# Patient Record
Sex: FEMALE | Race: WHITE | Employment: FULL TIME
[De-identification: names, ages, dates, MRNs, and addresses within clinical notes are randomized per-mention and may not be internally consistent; named-entity substitution may affect disease eponyms.]

---

## 2017-05-26 ENCOUNTER — HEALTH MAINTENANCE LETTER (OUTPATIENT)
Age: 37
End: 2017-05-26

## 2017-07-17 ENCOUNTER — OFFICE VISIT (OUTPATIENT)
Dept: FAMILY MEDICINE | Facility: CLINIC | Age: 37
End: 2017-07-17
Payer: COMMERCIAL

## 2017-07-17 VITALS
SYSTOLIC BLOOD PRESSURE: 102 MMHG | BODY MASS INDEX: 22.49 KG/M2 | DIASTOLIC BLOOD PRESSURE: 68 MMHG | OXYGEN SATURATION: 100 % | WEIGHT: 135 LBS | TEMPERATURE: 97.1 F | RESPIRATION RATE: 14 BRPM | HEART RATE: 74 BPM | HEIGHT: 65 IN

## 2017-07-17 DIAGNOSIS — I78.1 NON-NEOPLASTIC NEVUS: Primary | ICD-10-CM

## 2017-07-17 DIAGNOSIS — L91.8 SKIN TAG: ICD-10-CM

## 2017-07-17 PROCEDURE — 99207 ZZC DROP WITH A PROCEDURE: CPT | Mod: 25 | Performed by: FAMILY MEDICINE

## 2017-07-17 PROCEDURE — 11200 RMVL SKIN TAGS UP TO&INC 15: CPT | Performed by: FAMILY MEDICINE

## 2017-07-17 PROCEDURE — 11310 SHAVE SKIN LESION 0.5 CM/<: CPT | Mod: 59 | Performed by: FAMILY MEDICINE

## 2017-07-17 ASSESSMENT — PAIN SCALES - GENERAL: PAINLEVEL: NO PAIN (0)

## 2017-07-17 NOTE — MR AVS SNAPSHOT
After Visit Summary   7/17/2017    Lisa Combs    MRN: [id number]           Patient Information     Date Of Birth          1980        Visit Information        Provider Department      7/17/2017 11:20 AM Maral Ybarra MD [facility]         Follow-ups after your visit        Your next 10 appointments already scheduled     Aug 01, 2017  2:20 PM CDT   SHORT with Maral Augustine Mai, MD   [facility] ([facility])    [address] [place] [address] [id number] [phone]              Who to contact     If you have questions or need follow up information about today's clinic visit or your schedule please contact [facility] directly at [phone].  Normal or non-critical lab and imaging results will be communicated to you by MyChart, letter or phone within 4 business days after the clinic has received the results. If you do not hear from us within 7 days, please contact the clinic through MyChart or phone. If you have a critical or abnormal lab result, we will notify you by phone as soon as possible.  Submit refill requests through GAMINSIDE or call your pharmacy and they will forward the refill request to us. Please allow 3 business days for your refill to be completed.          Additional Information About Your Visit        MyChart Information     GAMINSIDE gives you secure access to your electronic health record. If you see a primary care provider, you can also send messages to your care team and make appointments. If you have questions, please call your primary care clinic.  If you do not have a primary care provider, please call [phone] and they will assist you.        Care EveryWhere ID     This is your Care EveryWhere ID. This could be used by other organizations to access your [place] medical records  [id number]        Your Vitals Were     Pulse Temperature Respirations Height Last Period Pulse Oximetry    74 97.1  F (36.2  " C) (Temporal) 14 5' 5\" (1.651 m) 07/15/2017 100%    BMI (Body Mass Index)                   22.47 kg/m2            Blood Pressure from Last 3 Encounters:   07/17/17 102/68   10/23/11 110/64   08/19/11 100/64    Weight from Last 3 Encounters:   07/17/17 135 lb (61.2 kg)   08/19/11 143 lb 12.8 oz (65.2 kg)   09/03/09 133 lb 9.6 oz (60.6 kg)              Today, you had the following     No orders found for display       Primary Care Provider    None Specified       No primary provider on file.        Equal Access to Services     [facility]: Hadcarly Shahid, sherry ruiz, emma gaston, mark medina . So [facility] [phone].    ATENCIÓN: Si habla español, tiene a hatch disposición servicios gratuitos de asistencia lingüística. Llame al [phone].    We comply with applicable federal civil rights laws and [place] laws. We do not discriminate on the basis of race, color, national origin, age, disability sex, sexual orientation or gender identity.            Thank you!     Thank you for choosing [facility]  for your care. Our goal is always to provide you with excellent care. Hearing back from our patients is one way we can continue to improve our services. Please take a few minutes to complete the written survey that you may receive in the mail after your visit with us. Thank you!             Your Updated Medication List - Protect others around you: Learn how to safely use, store and throw away your medicines at www.disposemymeds.org.          This list is accurate as of: 7/17/17 11:30 AM.  Always use your most recent med list.                   Brand Name Dispense Instructions for use Diagnosis    DEPO-PROVERA 150 MG/ML injection   Generic drug:  medroxyPROGESTERone      1 ML EVERY 3 MONTHS          "

## 2017-07-17 NOTE — NURSING NOTE
"Chief Complaint   Patient presents with     Consult     mole removal       Initial /68 (BP Location: Left arm, Patient Position: Sitting, Cuff Size: Adult Regular)  Pulse 74  Temp 97.1  F (36.2  C) (Temporal)  Resp 14  Ht 5' 5\" (1.651 m)  Wt 135 lb (61.2 kg)  LMP 07/15/2017  SpO2 100%  BMI 22.47 kg/m2 Estimated body mass index is 22.47 kg/(m^2) as calculated from the following:    Height as of this encounter: 5' 5\" (1.651 m).    Weight as of this encounter: 135 lb (61.2 kg).  Medication Reconciliation: complete     Health Maintenance Due   Topic Date Due     TETANUS IMMUNIZATION (SYSTEM ASSIGNED)  02/04/2000     PAP Q1 YR DIAGNOSTIC  08/19/2012     Health Maintenance reviewed at today's visit patient asked to schedule/complete:   Cervical Cancer:  Patient agrees to schedule  Immunizations:  Patient agrees to schedule    Tierney Clark MA 7/17/2017        "

## 2017-07-17 NOTE — PROGRESS NOTES
"  SUBJECTIVE:                                                    Lisa Combs is a 37 year old female who presents to clinic today for the following health issues:    Consult Mole Removal    She has several moles and skin tags that need to be removed.  One on the right face just below the eye.  Been there for years and it is getting bigger slowly. No change in shape or color.  It is irritating and obstructing her vision at times. Also has three skins tags - on on the base of her neck, on the back.  It gets irritated and painful when gets caught on the clothes and jewelry. One on the back where it gets rubbed on the bras which caused irritating.  Another one in her right inner thigh which get irritated and painful due to rubbing on the other thigh and clothes.  Has had them for years.  No change in size, shape or color. Np personal or family hx of skin cancer.     Problem list and histories reviewed & adjusted, as indicated.  Additional history: none    No current outpatient prescriptions on file.     Allergies   Allergen Reactions     No Known Drug Allergies        Reviewed and updated as needed this visit by clinical staff  Tobacco  Allergies  Meds  Soc Hx      Reviewed and updated as needed this visit by Provider         ROS:  Constitutional, HEENT, cardiovascular, pulmonary, gi and gu systems are negative, except as otherwise noted.    OBJECTIVE:     /68 (BP Location: Left arm, Patient Position: Sitting, Cuff Size: Adult Regular)  Pulse 74  Temp 97.1  F (36.2  C) (Temporal)  Resp 14  Ht 5' 5\" (1.651 m)  Wt 135 lb (61.2 kg)  LMP 07/15/2017  SpO2 100%  BMI 22.47 kg/m2  Body mass index is 22.47 kg/(m^2).  GENERAL: healthy, alert and no distress  SKIN: Right face, about 2 cm below the lower eyelid laterally, a fresh color nodule, about 0.3 cm in diameter, consistent with a benign looking nevus. A small skin tag on back of her neck which is about 0.2 cm diameter without irritation.  Another skin " tag on the back, between her shoulder blade which is about 0.3 cm in diameter and one right inner thigh that is about 0.4 cm.  All skin tags are fresh color, no irritation.    Diagnostic Test Results:  No results found for this or any previous visit (from the past 24 hour(s)).    ASSESSMENT/PLAN:   1. Non-neoplastic nevus  Inform her that it is benign in nature. If is irritating and obstructing her vision.  She requested to be removed today.  It was shaved off and please see my procedural note below for further details.  Tolerated the procedure well.  She was informed of potential scar on the face and she is ok with it.  Discussed with her about sending for pathology exam but she declined as is is most likely benign in nature.     Will sleep grossly- <5MM FACE,FACIAL    2. Skin tag  Discussed with her about the nature of the condition. Informed her that they are benign in nature nature. They have been irritating from rubbing. The skin tag was removed today with no problem. Please see procedure note is low for further details.    - Removal of up to 15 skin tags    The whole procedure discussed with Lisa in details.  Risks associated with the procedure discussed.  Alternatives was also discussed. She requested to have them excised today and the consent was obtained.    Patient was identified times three.  Name and location of the procedure was also discussed.    The whole procedure was done in a usual sterile manner.  The area was cleaned with alcohol swab,  2 ml of Lidocaine with epi injected directed at the base of the skin tags.  Good anesthesia noted.  The area was then clean with alcohol swab again.  The  was used to grab the skin tag and the scissors were used to trim off the skin tag at their bases.  Silver nitrate applied.  Minimal bleeding.  He tolerated the procedure well.  EBL was zero.    Post procedural care discussed. Encourage to keep the wound clean. Educate symptoms to call in or be seen.   Did not send for pathology exam.      Maral Augustine Mai, MD  [facility]

## 2017-07-18 NOTE — PROCEDURES
Excision of the facial nodule      The whole procedure discussed with Lisa in details.  Risks associated with the procedure discussed.  Alternatives was also discussed. She requested to have the nodule excised today and the consent was obtained.    Patient was identified times three.  Name and location of the procedure was also discussed.    The whole procedure was done in a usual sterile manner.  The area was cleaned with alcohol swab,  2 ml of Lidocaine with epi injected directed at the base of the skin tag.  Good anesthesia noted.  The area was then clean with alcohol swab.  The  was used to grab the skin tag and the blade # 15 was used to trim off the nodule at its base.  Silver nitrate applied.  Minimal bleeding.  She tolerated the procedure well.  EBL was lzero.    Post procedural care discussed. Encourage to keep the wound clean. Educate symptoms to call in or be seen.  Tissue was not sent for pathology exam per her request..

## 2017-08-01 ENCOUNTER — OFFICE VISIT (OUTPATIENT)
Dept: FAMILY MEDICINE | Facility: CLINIC | Age: 37
End: 2017-08-01
Payer: COMMERCIAL

## 2017-08-01 VITALS
OXYGEN SATURATION: 100 % | TEMPERATURE: 97.2 F | WEIGHT: 130.5 LBS | RESPIRATION RATE: 16 BRPM | HEART RATE: 80 BPM | SYSTOLIC BLOOD PRESSURE: 112 MMHG | BODY MASS INDEX: 21.72 KG/M2 | DIASTOLIC BLOOD PRESSURE: 78 MMHG

## 2017-08-01 DIAGNOSIS — Z00.00 ENCOUNTER FOR ROUTINE ADULT HEALTH EXAMINATION WITHOUT ABNORMAL FINDINGS: Primary | ICD-10-CM

## 2017-08-01 DIAGNOSIS — R79.89 ABNORMAL LIVER FUNCTION TESTS: ICD-10-CM

## 2017-08-01 DIAGNOSIS — Z72.0 TOBACCO USER: ICD-10-CM

## 2017-08-01 DIAGNOSIS — Z87.42 HX OF ABNORMAL CERVICAL PAP SMEAR: ICD-10-CM

## 2017-08-01 PROCEDURE — 87591 N.GONORRHOEAE DNA AMP PROB: CPT | Performed by: FAMILY MEDICINE

## 2017-08-01 PROCEDURE — 99395 PREV VISIT EST AGE 18-39: CPT | Performed by: FAMILY MEDICINE

## 2017-08-01 PROCEDURE — 87491 CHLMYD TRACH DNA AMP PROBE: CPT | Performed by: FAMILY MEDICINE

## 2017-08-01 PROCEDURE — 87624 HPV HI-RISK TYP POOLED RSLT: CPT | Performed by: FAMILY MEDICINE

## 2017-08-01 PROCEDURE — G0145 SCR C/V CYTO,THINLAYER,RESCR: HCPCS | Performed by: FAMILY MEDICINE

## 2017-08-01 ASSESSMENT — PAIN SCALES - GENERAL: PAINLEVEL: NO PAIN (0)

## 2017-08-01 NOTE — NURSING NOTE
"Chief Complaint   Patient presents with     Physical       Initial /78 (BP Location: Left arm, Patient Position: Chair, Cuff Size: Adult Regular)  Pulse 80  Temp 97.2  F (36.2  C) (Temporal)  Resp 16  Wt 130 lb 8 oz (59.2 kg)  LMP 07/15/2017  SpO2 100%  Breastfeeding? No  BMI 21.72 kg/m2 Estimated body mass index is 21.72 kg/(m^2) as calculated from the following:    Height as of 7/17/17: 5' 5\" (1.651 m).    Weight as of this encounter: 130 lb 8 oz (59.2 kg).  Medication Reconciliation: complete     Health Maintenance Due   Topic Date Due     PAP Q1 YR DIAGNOSTIC  08/19/2012     Hien Law CMA      "

## 2017-08-01 NOTE — MR AVS SNAPSHOT
After Visit Summary   8/1/2017    Lisa Combs    MRN: [id number]           Patient Information     Date Of Birth          1980        Visit Information        Provider Department      8/1/2017 2:20 PM Maral Ybarra MD [facility]        Today's Diagnoses     Encounter for routine adult health examination without abnormal findings    -  1    Abnormal liver function tests        Cervical high risk HPV (human papillomavirus) test positive          Care Instructions      Preventive Health Recommendations  Female Ages 26 - 39  Yearly exam:   See your health care provider every year in order to    Review health changes.     Discuss preventive care.      Review your medicines if you your doctor has prescribed any.    Until age 30: Get a Pap test every three years (more often if you have had an abnormal result).    After age 30: Talk to your doctor about whether you should have a Pap test every 3 years or have a Pap test with HPV screening every 5 years.   You do not need a Pap test if your uterus was removed (hysterectomy) and you have not had cancer.  You should be tested each year for STDs (sexually transmitted diseases), if you're at risk.   Talk to your provider about how often to have your cholesterol checked.  If you are at risk for diabetes, you should have a diabetes test (fasting glucose).  Shots: Get a flu shot each year. Get a tetanus shot every 10 years.   Nutrition:     Eat at least 5 servings of fruits and vegetables each day.    Eat whole-grain bread, whole-wheat pasta and brown rice instead of white grains and rice.    Talk to your provider about Calcium and Vitamin D.     Lifestyle    Exercise at least 150 minutes a week (30 minutes a day, 5 days of the week). This will help you control your weight and prevent disease.    Limit alcohol to one drink per day.    No smoking.     Wear sunscreen to prevent skin cancer.    See your dentist every six months for an exam and  cleaning.            Follow-ups after your visit        Future tests that were ordered for you today     Open Future Orders        Priority Expected Expires Ordered    **Lipid panel reflex to direct LDL FUTURE anytime Routine 8/1/2017 8/1/2018 8/1/2017    **Comprehensive metabolic panel FUTURE anytime Routine 8/1/2017 8/1/2018 8/1/2017    TSH with free T4 reflex Routine  10/1/2017 8/1/2017            Who to contact     If you have questions or need follow up information about today's clinic visit or your schedule please contact [facility] directly at [phone].  Normal or non-critical lab and imaging results will be communicated to you by BitGymhart, letter or phone within 4 business days after the clinic has received the results. If you do not hear from us within 7 days, please contact the clinic through quietrevolutiont or phone. If you have a critical or abnormal lab result, we will notify you by phone as soon as possible.  Submit refill requests through Altammune or call your pharmacy and they will forward the refill request to us. Please allow 3 business days for your refill to be completed.          Additional Information About Your Visit        MyChart Information     [employer] gives you secure access to your electronic health record. If you see a primary care provider, you can also send messages to your care team and make appointments. If you have questions, please call your primary care clinic.  If you do not have a primary care provider, please call [phone] and they will assist you.        Care EveryWhere ID     This is your Care EveryWhere ID. This could be used by other organizations to access your [place] medical records  [id number]        Your Vitals Were     Pulse Temperature Respirations Last Period Pulse Oximetry Breastfeeding?    80 97.2  F (36.2  C) (Temporal) 16 07/15/2017 100% No    BMI (Body Mass Index)                   21.72 kg/m2            Blood Pressure from Last 3 Encounters:    08/01/17 112/78   07/17/17 102/68   10/23/11 110/64    Weight from Last 3 Encounters:   08/01/17 130 lb 8 oz (59.2 kg)   07/17/17 135 lb (61.2 kg)   08/19/11 143 lb 12.8 oz (65.2 kg)              We Performed the Following     CHLAMYDIA TRACHOMATIS PCR     HPV High Risk Types DNA Cervical     NEISSERIA GONORRHOEA PCR     Pap imaged thin layer screen with HPV - recommended age 30 - 65        Primary Care Provider    None Specified       No primary provider on file.        Equal Access to Services     [facility] : Erendira Shahid, sherry ruiz, emma ordonezalcatrachito gaston, mark medina . So [facility] [phone].    ATENCIÓN: Si habla español, tiene a hatch disposición servicios gratuitos de asistencia lingüística. Llame al [phone].    We comply with applicable federal civil rights laws and [place] laws. We do not discriminate on the basis of race, color, national origin, age, disability sex, sexual orientation or gender identity.            Thank you!     Thank you for choosing [facility]  for your care. Our goal is always to provide you with excellent care. Hearing back from our patients is one way we can continue to improve our services. Please take a few minutes to complete the written survey that you may receive in the mail after your visit with us. Thank you!             Your Updated Medication List - Protect others around you: Learn how to safely use, store and throw away your medicines at www.disposemymeds.org.      Notice  As of 8/1/2017  3:15 PM    You have not been prescribed any medications.

## 2017-08-01 NOTE — PROGRESS NOTES
SUBJECTIVE:   CC: Lisa Combs is an 37 year old woman who presents for preventive health visit.     Healthy Habits:    Do you get at least three servings of calcium containing foods daily (dairy, green leafy vegetables, etc.)? yes    Amount of exercise or daily activities, outside of work: 5 day(s) per week    Problems taking medications regularly No    Medication side effects: No    Have you had an eye exam in the past two years? no    Do you see a dentist twice per year? yes    Do you have sleep apnea, excessive snoring or daytime drowsiness?no      Today's PHQ-2 Score: PHQ-2 ( 1999 Pfizer) 8/1/2017 7/17/2017   Q1: Little interest or pleasure in doing things 0 0   Q2: Feeling down, depressed or hopeless 0 0   PHQ-2 Score 0 0     Lisa is here today for her general physical.  Overall she is doing well and and has other concern.  She has a hx of abnormal and a LEEP done when she was 19 year ol.  Last abnormal pap was in 2003. Pap smear in 2007, 2009, 2011 were normal. Last pap smear done was 2011.  She is s/p tubal ligation.  No concern about STD risk but is ok to checked for G/C.  Her last physical exam was years years ago and there was not major medical care or procedure done since then.  Denies of headache or dizziness, No acute visual change. No URI symptoms include running nose, nasal congestion, coughing, fever or chill. No CP/SOB. No N/V/D/C. Denies of having any problem with urination.  No abdominal pain.  Her menstrual period has been regular and her Patient's last menstrual period was 07/15/2017. and it was normal.  Menstrual period has been normal and regular.  No leg swelling. Denies of having any pain. Stated that she exercises regularly, about 2-3 times a week with walking.  Social alcohol drinker and denies of drug use. Smokes about 1/2 ppd and is not ready to quit.  No problem with sleeping.  Denies of being abused, feel safe at home and at work.  Lives with her  and children. No  other concern today.     Abuse: Current or Past(Physical, Sexual or Emotional)- No  Do you feel safe in your environment - Yes  Social History   Substance Use Topics     Smoking status: Current Every Day Smoker     Packs/day: 0.50     Years: 21.00     Types: Cigarettes     Smokeless tobacco: Never Used      Comment: started age 16     Alcohol use Yes      Comment: socially     The patient does not drink >3 drinks per day nor >7 drinks per week.    Reviewed orders with patient.  Reviewed health maintenance and updated orders accordingly - Yes    Mammogram not appropriate for this patient based on age.    Pertinent mammograms are reviewed under the imaging tab.  History of abnormal Pap smear:   NO - age 30- 65 PAP every 3 years recommended  Last 3 Pap Results:   PAP (no units)   Date Value   2017 NIL   2011 NIL   2009 NIL       Reviewed and updated as needed this visit by clinical staff  Tobacco  Allergies  Meds  Soc Hx        Reviewed and updated as needed this visit by Provider        Past Medical History:   Diagnosis Date     ASCUS on Pap smear 08, 09    + high risk HPV (done at outside facility)     History of colposcopy with cervical biopsy 10/11/01, 9/3/09    2001-biopsy = SYDNIE 2.  -negative     LSIL (low grade squamous intraepithelial lesion) on Pap smear 5/10/01     MEDICAL HISTORY OF -     abnl paps      Past Surgical History:   Procedure Laterality Date     CHOLECYSTECTOMY       HC COLP CERVIX/UPPER VAGINA W LOOP ELEC BX CERVIX       LEEP TX, CERVICAL  2002     LEEP TX, CERVICAL       TUBAL LIGATION       Obstetric History       T2      L2     SAB0   TAB0   Ectopic0   Multiple0   Live Births2       # Outcome Date GA Lbr Jean/2nd Weight Sex Delivery Anes PTL Lv   2 Term  40w0d  5 lb 4 oz (2.381 kg) F    JOSE      Name: Genesis   1 Term  40w0d  7 lb 4 oz (3.289 kg) M    JOSE      Name: Sterling          ROS:  C: NEGATIVE for fever, chills, change in  weight  I: NEGATIVE for worrisome rashes, moles or lesions  E: NEGATIVE for vision changes or irritation  ENT: NEGATIVE for ear, mouth and throat problems  R: NEGATIVE for significant cough or SOB  B: NEGATIVE for masses, tenderness or discharge  CV: NEGATIVE for chest pain, palpitations or peripheral edema  GI: NEGATIVE for nausea, abdominal pain, heartburn, or change in bowel habits  : NEGATIVE for unusual urinary or vaginal symptoms. Periods are regular.  M: NEGATIVE for significant arthralgias or myalgia  N: NEGATIVE for weakness, dizziness or paresthesias  E: NEGATIVE for temperature intolerance, skin/hair changes  H: NEGATIVE for bleeding problems  P: NEGATIVE for changes in mood or affect    OBJECTIVE:   /78 (BP Location: Left arm, Patient Position: Chair, Cuff Size: Adult Regular)  Pulse 80  Temp 97.2  F (36.2  C) (Temporal)  Resp 16  Wt 130 lb 8 oz (59.2 kg)  LMP 07/15/2017  SpO2 100%  Breastfeeding? No  BMI 21.72 kg/m2  EXAM:  GENERAL: healthy, alert and no distress.   EYES: Eyes grossly normal to inspection, PERRL and conjunctivae and sclerae normal  HENT: ear canals and TM's normal.  Nares are non-congested. Oropharynx is pink and moist. No tender with palpation to the sinuses.   NECK: no adenopathy, supple and thyroid normal to palpation. No tender with palpation to the cervical spine.  RESP: lungs clear to auscultation - no rales, rhonchi or wheezes  BREAST: offered and declined.  She has no concern   CV: regular rate and rhythm, no murmur, no peripheral edema and peripheral pulses strong  ABDOMEN: soft, nontender and bowel sounds normal   (female): normal female external genitalia, normal urethral meatus, vaginal mucosa, normal cervix/adnexa/uterus without masses or discharge. Pending for pap and HPV.  MS: no gross musculoskeletal defects noted, no edema. All joints are in the normal range of motion and 4 extremities were equally in strength. Hips, knees, ankles, shoulders, elbows,  wrists exams were normal. Fine motor skills of the fingers are intact. Back is straight, no tender with palpation to the spine.  SKIN: no suspicious lesions or rashes  NEURO: Normal strength and tone, mentation intact and speech normal. No focal deficit.  PSYCH: mentation appears normal, affect normal/bright    ASSESSMENT/PLAN:   1. Encounter for routine adult health examination without abnormal findings  Generally Lisa is healthy.  Discussed with her about age appropriate preventive care, including safety issue, STD/pregnancy prevention, substance abuse prevention and domestic violence prevention.  UTD for immunization.  Healthy diet and daily exercise encouraged.  Good sleeping hygiene discussed.  All of her questions were answered.  Lab today: as ordered below.     - NEISSERIA GONORRHOEA PCR  - CHLAMYDIA TRACHOMATIS PCR  - **Lipid panel reflex to direct LDL FUTURE anytime; Future  - TSH with free T4 reflex; Future    2. Abnormal liver function tests  CMP check and will consider further evaluation if indicated.    - Comprehensive metabolic panel FUTURE anytime; Future    3. Hx of abnormal cervical Pap smear  With hx of positive high risk HPV.  Last abnormal pap smear was in 2003.  The last three pap smear was normal.  If pap smear is normal this time, recommend repeat in 3 years.    - Pap imaged thin layer screen with HPV - recommended age 30 - 65  - HPV High Risk Types DNA Cervical    4. Tobacco user  Lisa has been smoking for years.  Discussed with her about the long and short term consequences of tobacco smoking.  Discussed with her about different options for smoking cessation aids.  She is not ready to quit smoking at this time.  Encourage to let me know when she is ready to quit.  In a mean time, I encourage her to reduce to amount of cigarrets smoke as much as possible.  All of her questions were answered.        COUNSELING:   Reviewed preventive health counseling, as reflected in patient  "instructions       Regular exercise       Healthy diet/nutrition       Alcohol Use       Safe sex practices/STD prevention      BP Screening:   Last 3 BP Readings:    BP Readings from Last 3 Encounters:   08/01/17 112/78   07/17/17 102/68   10/23/11 110/64       The following was recommended to the patient:  Re-screen BP within a year and recommended lifestyle modifications   reports that she has been smoking Cigarettes.  She has a 10.50 pack-year smoking history. She has never used smokeless tobacco.  Tobacco Cessation Action Plan: Information offered: Patient not interested at this time  Estimated body mass index is 21.72 kg/(m^2) as calculated from the following:    Height as of 7/17/17: 5' 5\" (1.651 m).    Weight as of this encounter: 130 lb 8 oz (59.2 kg).         Counseling Resources:  ATP IV Guidelines  Pooled Cohorts Equation Calculator  Breast Cancer Risk Calculator  FRAX Risk Assessment  ICSI Preventive Guidelines  Dietary Guidelines for Americans, 2010  USDA's MyPlate  ASA Prophylaxis  Lung CA Screening    Maral Augustine Mai, MD  [facility]  "

## 2017-08-02 LAB
C TRACH DNA SPEC QL NAA+PROBE: NORMAL
N GONORRHOEA DNA SPEC QL NAA+PROBE: NORMAL
SPECIMEN SOURCE: NORMAL
SPECIMEN SOURCE: NORMAL

## 2017-08-03 LAB
COPATH REPORT: NORMAL
PAP: NORMAL

## 2017-08-06 PROBLEM — Z87.42 HX OF ABNORMAL CERVICAL PAP SMEAR: Status: ACTIVE | Noted: 2017-08-06

## 2017-08-07 LAB
FINAL DIAGNOSIS: NORMAL
HPV HR 12 DNA CVX QL NAA+PROBE: NEGATIVE
HPV16 DNA SPEC QL NAA+PROBE: NEGATIVE
HPV18 DNA SPEC QL NAA+PROBE: NEGATIVE
SPECIMEN DESCRIPTION: NORMAL

## 2017-11-22 ENCOUNTER — OFFICE VISIT (OUTPATIENT)
Dept: FAMILY MEDICINE | Facility: CLINIC | Age: 37
End: 2017-11-22
Payer: COMMERCIAL

## 2017-11-22 VITALS
SYSTOLIC BLOOD PRESSURE: 118 MMHG | TEMPERATURE: 99.4 F | HEART RATE: 66 BPM | BODY MASS INDEX: 21.75 KG/M2 | DIASTOLIC BLOOD PRESSURE: 80 MMHG | WEIGHT: 130.7 LBS

## 2017-11-22 DIAGNOSIS — N39.0 URINARY TRACT INFECTION WITHOUT HEMATURIA, SITE UNSPECIFIED: Primary | ICD-10-CM

## 2017-11-22 DIAGNOSIS — R10.9 FLANK PAIN: Primary | ICD-10-CM

## 2017-11-22 DIAGNOSIS — R10.9 FLANK PAIN: ICD-10-CM

## 2017-11-22 LAB
ALBUMIN UR-MCNC: NEGATIVE MG/DL
AMORPH CRY #/AREA URNS HPF: ABNORMAL /HPF
APPEARANCE UR: CLEAR
BACTERIA #/AREA URNS HPF: ABNORMAL /HPF
BILIRUB UR QL STRIP: NEGATIVE
COLOR UR AUTO: YELLOW
GLUCOSE UR STRIP-MCNC: NEGATIVE MG/DL
HGB UR QL STRIP: ABNORMAL
KETONES UR STRIP-MCNC: NEGATIVE MG/DL
LEUKOCYTE ESTERASE UR QL STRIP: ABNORMAL
MUCOUS THREADS #/AREA URNS LPF: PRESENT /LPF
NITRATE UR QL: NEGATIVE
PH UR STRIP: 6 PH (ref 5–7)
RBC #/AREA URNS AUTO: 1 /HPF (ref 0–2)
SOURCE: ABNORMAL
SP GR UR STRIP: 1 (ref 1–1.03)
SQUAMOUS #/AREA URNS AUTO: <1 /HPF (ref 0–1)
UROBILINOGEN UR STRIP-MCNC: 0 MG/DL (ref 0–2)
WBC #/AREA URNS AUTO: 15 /HPF (ref 0–2)

## 2017-11-22 PROCEDURE — 81001 URINALYSIS AUTO W/SCOPE: CPT | Performed by: FAMILY MEDICINE

## 2017-11-22 PROCEDURE — 99213 OFFICE O/P EST LOW 20 MIN: CPT | Performed by: NURSE PRACTITIONER

## 2017-11-22 RX ORDER — CIPROFLOXACIN 250 MG/1
250 TABLET, FILM COATED ORAL 2 TIMES DAILY
Qty: 6 TABLET | Refills: 0 | Status: SHIPPED | OUTPATIENT
Start: 2017-11-22 | End: 2018-04-03

## 2017-11-22 NOTE — NURSING NOTE
"Chief Complaint   Patient presents with     Flank Pain       Initial There were no vitals taken for this visit. Estimated body mass index is 21.72 kg/(m^2) as calculated from the following:    Height as of 7/17/17: 5' 5\" (1.651 m).    Weight as of 8/1/17: 130 lb 8 oz (59.2 kg).  Medication Reconciliation: complete  "

## 2017-11-22 NOTE — PROGRESS NOTES
SUBJECTIVE:   Lisa Combs is a 37 year old female who presents to clinic today for the following health issues:      FLANK   PAIN     Onset: 5 days    Description:   Character: Dull ache, sharp pain with coughing  Location: right flank  Radiation: None    Intensity: 5-7/10    Progression of Symptoms:  worsening    Accompanying Signs & Symptoms:  Fever/Chills?: YES- 99.4  Gas/Bloating: no   Nausea: YES  Vomitting: no   Diarrhea?: no   Constipation:no   Dysuria or Hematuria: no    History:   Trauma: no   Previous similar pain: YES- kidney infections in th past   Previous tests done: none    Precipitating factors:   Does the pain change with:     Food: no      BM: no     Urination: no     Alleviating factors:  Took  pain med this morning    Therapies Tried and outcome: husbands med did help for a while    LMP:  11/1/17         The patient is seen today with complaints of persistent right flank pain for the last 5 or 6 days. She's been running a low-grade temp, felt nauseated but had no vomiting. She denies abdominal pain. Denies dysuria or hematuria. She has had kidney infections in the past, and states discomfort feels similar.    Problem list and histories reviewed & adjusted, as indicated.  Additional history: as documented    BP Readings from Last 3 Encounters:   11/22/17 118/80   08/01/17 112/78   07/17/17 102/68    Wt Readings from Last 3 Encounters:   11/22/17 130 lb 11.2 oz (59.3 kg)   08/01/17 130 lb 8 oz (59.2 kg)   07/17/17 135 lb (61.2 kg)                      Reviewed and updated as needed this visit by clinical staffTobacco  Allergies  Meds  Med Hx  Surg Hx  Fam Hx  Soc Hx      Reviewed and updated as needed this visit by Provider         ROS:  Constitutional, HEENT, cardiovascular, pulmonary, gi and gu systems are negative, except as otherwise noted.      OBJECTIVE:   /80  Pulse 66  Temp 99.4  F (37.4  C) (Tympanic)  Wt 130 lb 11.2 oz (59.3 kg)  LMP 11/01/2017  BMI 21.75  kg/m2  Body mass index is 21.75 kg/(m^2).   GENERAL: healthy, alert and no distress  NECK: no adenopathy, no asymmetry, masses, or scars and thyroid normal to palpation  RESP: lungs clear to auscultation - no rales, rhonchi or wheezes  CV: regular rate and rhythm, normal S1 S2, no S3 or S4, no murmur, click or rub, no peripheral edema and peripheral pulses strong  ABDOMEN: Soft, nondistended, nontender to palpation. No organomegaly or masses palpated. She does have tenderness with percussion of the right flank  MS: no gross musculoskeletal defects noted, no edema  SKIN: no suspicious lesions or rashes    Diagnostic Test Results:  Results for orders placed or performed in visit on 11/22/17 (from the past 24 hour(s))   UA reflex to Microscopic   Result Value Ref [place]    Color Urine Yellow     Appearance Urine Clear     Glucose Urine Negative NEG^Negative mg/dL    Bilirubin Urine Negative NEG^Negative    Ketones Urine Negative NEG^Negative mg/dL    Specific Gravity Urine 1.005 1.003 - 1.035    Blood Urine Moderate (A) NEG^Negative    pH Urine 6.0 5.0 - 7.0 pH    Protein Albumin Urine Negative NEG^Negative mg/dL    Urobilinogen mg/dL 0.0 0.0 - 2.0 mg/dL    Nitrite Urine Negative NEG^Negative    Leukocyte Esterase Urine Small (A) NEG^Negative    Source Unspecified Urine     RBC Urine 1 0 - 2 /HPF    WBC Urine 15 (H) 0 - 2 /HPF    Bacteria Urine Many (A) NEG^Negative /HPF    Squamous Epithelial /HPF Urine <1 0 - 1 /HPF    Mucous Urine Present (A) NEG^Negative /LPF    Amorphous Crystals Moderate (A) NEG^Negative /HPF       ASSESSMENT/PLAN:     Problem List Items Addressed This Visit     None      Visit Diagnoses     Urinary tract infection without hematuria, site unspecified    -  Primary    Relevant Medications    ciprofloxacin (CIPRO) 250 MG tablet    Other Relevant Orders    *UA reflex to Microscopic and Culture ([place]; [facility]; [facility]; [facility]; [facility]; [facility]; [place]; [place])     Flank pain               Cipro 250 mg 1 tablet b.i.d. for 3 days   Increase oral fluids  Recheck urinalysis in 4-5 days      LEONARDO Hines CNP  [facility]

## 2017-11-22 NOTE — MR AVS SNAPSHOT
After Visit Summary   11/22/2017    Lisa Combs    MRN: [id number]           Patient Information     Date Of Birth          1980        Visit Information        Provider Department      11/22/2017 1:15 PM Ernestina Thomas APRN [facility]        Today's Diagnoses     Urinary tract infection without hematuria, site unspecified    -  1    Flank pain           Follow-ups after your visit        Future tests that were ordered for you today     Open Future Orders        Priority Expected Expires Ordered    *UA reflex to Microscopic and Culture ([place]; [facility]; [facility]; [facility]; [facility]; [facility]; [place]; [place]) Routine  11/22/2018 11/22/2017            Who to contact     If you have questions or need follow up information about today's clinic visit or your schedule please contact [facility] directly at [phone].  Normal or non-critical lab and imaging results will be communicated to you by MyChart, letter or phone within 4 business days after the clinic has received the results. If you do not hear from us within 7 days, please contact the clinic through [employer] or phone. If you have a critical or abnormal lab result, we will notify you by phone as soon as possible.  Submit refill requests through [employer] or call your pharmacy and they will forward the refill request to us. Please allow 3 business days for your refill to be completed.          Additional Information About Your Visit        MyChart Information     [employer] gives you secure access to your electronic health record. If you see a primary care provider, you can also send messages to your care team and make appointments. If you have questions, please call your primary care clinic.  If you do not have a primary care provider, please call [phone] and they will assist you.        Care EveryWhere ID     This is your Care EveryWhere ID. This could be used by  other organizations to access your [facility] medical records  [id number]        Your Vitals Were     Pulse Temperature Last Period BMI (Body Mass Index)          66 99.4  F (37.4  C) (Tympanic) 11/01/2017 21.75 kg/m2         Blood Pressure from Last 3 Encounters:   11/22/17 118/80   08/01/17 112/78   07/17/17 102/68    Weight from Last 3 Encounters:   11/22/17 130 lb 11.2 oz (59.3 kg)   08/01/17 130 lb 8 oz (59.2 kg)   07/17/17 135 lb (61.2 kg)              We Performed the Following     UA reflex to Microscopic          Today's Medication Changes          These changes are accurate as of: 11/22/17  2:53 PM.  If you have any questions, ask your nurse or doctor.               Start taking these medicines.        Dose/Directions    ciprofloxacin 250 MG tablet   Commonly known as:  CIPRO   Used for:  Urinary tract infection without hematuria, site unspecified   Started by:  Ernestina Thomas, LEONARDO CNP        Dose:  250 mg   Take 1 tablet (250 mg) by mouth 2 times daily   Quantity:  6 tablet   Refills:  0            Where to get your medicines      These medications were sent to [facility], [place] - [address]   [address] , [place] [place] [facility] [address]     Phone:  [phone]     ciprofloxacin 250 MG tablet                Primary Care Provider    Physician No Ref-Primary       NO REF-PRIMARY PHYSICIAN        Equal Access to Services     J LUIS NUÑEZ AH: Erendira palmer hadasho Soomaali, waaxda luqadaha, qaybta kaalmada adeelena, mark cortez. So [facility] [phone].    ATENCIÓN: Si habla español, tiene a hatch disposición servicios gratuitos de asistencia lingüística. Fayame al [phone].    We comply with applicable federal civil rights laws and [place] laws. We do not discriminate on the basis of race, color, national origin, age, disability, sex, sexual orientation, or gender identity.            Thank you!     Thank you for choosing [facility]  for  your care. Our goal is always to provide you with excellent care. Hearing back from our patients is one way we can continue to improve our services. Please take a few minutes to complete the written survey that you may receive in the mail after your visit with us. Thank you!             Your Updated Medication List - Protect others around you: Learn how to safely use, store and throw away your medicines at www.disposemymeds.org.          This list is accurate as of: 11/22/17  2:53 PM.  Always use your most recent med list.                   Brand Name Dispense Instructions for use Diagnosis    ciprofloxacin 250 MG tablet    CIPRO    6 tablet    Take 1 tablet (250 mg) by mouth 2 times daily    Urinary tract infection without hematuria, site unspecified

## 2018-04-03 ENCOUNTER — OFFICE VISIT (OUTPATIENT)
Dept: FAMILY MEDICINE | Facility: CLINIC | Age: 38
End: 2018-04-03
Payer: COMMERCIAL

## 2018-04-03 VITALS
WEIGHT: 124.4 LBS | HEART RATE: 104 BPM | OXYGEN SATURATION: 98 % | DIASTOLIC BLOOD PRESSURE: 60 MMHG | SYSTOLIC BLOOD PRESSURE: 104 MMHG | HEIGHT: 64 IN | RESPIRATION RATE: 16 BRPM | BODY MASS INDEX: 21.24 KG/M2 | TEMPERATURE: 97.8 F

## 2018-04-03 DIAGNOSIS — F17.200 TOBACCO USE DISORDER: ICD-10-CM

## 2018-04-03 DIAGNOSIS — L91.8 SKIN TAG: ICD-10-CM

## 2018-04-03 DIAGNOSIS — I78.1 NON-NEOPLASTIC NEVUS: Primary | ICD-10-CM

## 2018-04-03 PROCEDURE — 99213 OFFICE O/P EST LOW 20 MIN: CPT | Performed by: FAMILY MEDICINE

## 2018-04-03 ASSESSMENT — PAIN SCALES - GENERAL: PAINLEVEL: NO PAIN (0)

## 2018-04-03 NOTE — NURSING NOTE
"Chief Complaint   Patient presents with     Derm Problem     moles, side of left eye, skin tags on her neck       Initial /60 (BP Location: Left arm, Patient Position: Chair, Cuff Size: Adult Regular)  Pulse 104  Temp 97.8  F (36.6  C) (Temporal)  Resp 16  Ht 5' 4.4\" (1.636 m)  Wt 124 lb 6.4 oz (56.4 kg)  LMP 03/20/2018  SpO2 98%  Breastfeeding? No  BMI 21.09 kg/m2 Estimated body mass index is 21.09 kg/(m^2) as calculated from the following:    Height as of this encounter: 5' 4.4\" (1.636 m).    Weight as of this encounter: 124 lb 6.4 oz (56.4 kg).  Medication Reconciliation: complete     There are no preventive care reminders to display for this patient.    Hien Law, KAVON      "

## 2018-04-03 NOTE — PROGRESS NOTES
SUBJECTIVE:   Lisa Combs is a 38 year old female who presents to clinic today for the following health issues:    Concern - Mole by left eye and some skin tags on neck  Onset: ongoing    Description:   Mole by left eye and some skin tags     Intensity: n/a    Progression of Symptoms:  same    Accompanying Signs & Symptoms:  n/a    Previous history of similar problem:   n/a    Precipitating factors:   Worsened by: n/a    Alleviating factors:  Improved by: n/a    Therapies Tried and outcome: n/a    Lisa is here for couple of concerns.  First concern is about the mole on her left eye that she has for years.  Overall it is the same, no change in color or shape or size.  It gets irritated at frequently when it gets rubbed on.  She would like it to be removed.  Also has several skins tags in the neck which also get irritated when it gets caught on clothes and jewelries.  No personal family history is of skin cancer.    Second concern is about smoking cessation.  Been a smoker for many years.  Currently smoke about a pack a day.  Tried to stop once or twice with the patch but did not like it.  Never tries medication.  Motivated to stop smoking and has good support at home.  No history of depression or anxiety.  Denies of suicidal or homicidal ideation.    Problem list and histories reviewed & adjusted, as indicated.  Additional history: as documented    Current Outpatient Prescriptions   Medication Sig Dispense Refill     varenicline (CHANTIX STARTING MONTH PAK) 0.5 MG X 11 & 1 MG X 42 tablet Take 0.5 mg tab daily for 3 days, then 0.5 mg tab twice daily for 4 days, then 1 mg twice daily. 53 tablet 0     Allergies   Allergen Reactions     No Known Drug Allergies        Reviewed and updated as needed this visit by clinical staff  Tobacco  Allergies  Meds  Soc Hx      Reviewed and updated as needed this visit by Provider         ROS:  Constitutional, HEENT, cardiovascular, pulmonary, gi and gu systems are  "negative, except as otherwise noted.    OBJECTIVE:     /60 (BP Location: Left arm, Patient Position: Chair, Cuff Size: Adult Regular)  Pulse 104  Temp 97.8  F (36.6  C) (Temporal)  Resp 16  Ht 5' 4.4\" (1.636 m)  Wt 124 lb 6.4 oz (56.4 kg)  LMP 03/20/2018  SpO2 98%  Breastfeeding? No  BMI 21.09 kg/m2  Body mass index is 21.09 kg/(m^2).  GENERAL: healthy, alert and no distress  RESP: lungs clear to auscultation - no rales, rhonchi or wheezes  CV: regular rate and rhythm, no murmur.  SKIN: no suspicious lesions or rashes.  Multiple skin tags on the neck; they are not irritated or inflamed.  A brownish nodule, about 0.2 cm in diameter noted on the face, lateral to the left eye.  Is symmetrical with well demarcated border.  Non-irritated.  PSYCH: mentation appears normal, affect normal/bright.  Thoughts are intact, no suicidal/homicidal ideation.  No hallucination.     Diagnostic Test Results:  No results found for this or any previous visit (from the past 24 hour(s)).    ASSESSMENT/PLAN:     1. Non-neoplastic nevus  The nodule on the face lateral to the left eye is consistent with a benign nevus.  I discussed with her about the nature of the condition.  It is irritating to her at times and she would like to be removed.  Recommend to return for shave biopsy.  In a mean time, I recommend her to monitor for changes in color, size and borders and notify us if noted any of these changes.  She feels comfortable with the plan      2. Skin tag  Again, she was reassured that they are benign in nature.  They are irritating to get caught on jewelry and clothes.  Will have them removed when she is back for her mole removal.    3. Tobacco use disorder  Lisa has been smoking tobacco for years.  She is now motivated to quit smoking.  She never tries medication before.   I informed her that quitting chewing tobacco will have good affect on her health in the future.  Discussed with her about different options for " smoking cessation aids.  She would like to try the Chantix. No contraindication identified. Prescription was given and side effects discussed.  Educate her about symptoms to call in or be seen. Offered quit line referral but she declined.   Follow up in a month.  All of her questions were answered.    - varenicline (CHANTIX STARTING MONTH XAVIER) 0.5 MG X 11 & 1 MG X 42 tablet; Take 0.5 mg tab daily for 3 days, then 0.5 mg tab twice daily for 4 days, then 1 mg twice daily.  Dispense: 53 tablet; Refill: 0      Maral Augustine Mai, MD  [facility]

## 2018-04-03 NOTE — MR AVS SNAPSHOT
"              After Visit Summary   4/3/2018    Lisa Combs    MRN: [id number]           Patient Information     Date Of Birth          1980        Visit Information        Provider Department      4/3/2018 8:40 AM Maral Ybarra MD [facility]        Today's Diagnoses     Non-neoplastic nevus    -  1    Skin tag        Tobacco use disorder           Follow-ups after your visit        Who to contact     If you have questions or need follow up information about today's clinic visit or your schedule please contact [facility] directly at [phone].  Normal or non-critical lab and imaging results will be communicated to you by REAL SAMURAIhart, letter or phone within 4 business days after the clinic has received the results. If you do not hear from us within 7 days, please contact the clinic through [employer] or phone. If you have a critical or abnormal lab result, we will notify you by phone as soon as possible.  Submit refill requests through [employer] or call your pharmacy and they will forward the refill request to us. Please allow 3 business days for your refill to be completed.          Additional Information About Your Visit        MyChart Information     [employer] gives you secure access to your electronic health record. If you see a primary care provider, you can also send messages to your care team and make appointments. If you have questions, please call your primary care clinic.  If you do not have a primary care provider, please call [phone] and they will assist you.        Care EveryWhere ID     This is your Care EveryWhere ID. This could be used by other organizations to access your [place] medical records  [id number]        Your Vitals Were     Pulse Temperature Respirations Height Last Period Pulse Oximetry    104 97.8  F (36.6  C) (Temporal) 16 5' 4.4\" (1.636 m) 03/20/2018 98%    Breastfeeding? BMI (Body Mass Index)                No 21.09 kg/m2           Blood " Pressure from Last 3 Encounters:   04/03/18 104/60   11/22/17 118/80   08/01/17 112/78    Weight from Last 3 Encounters:   04/03/18 124 lb 6.4 oz (56.4 kg)   11/22/17 130 lb 11.2 oz (59.3 kg)   08/01/17 130 lb 8 oz (59.2 kg)              Today, you had the following     No orders found for display         Today's Medication Changes          These changes are accurate as of 4/3/18  5:26 PM.  If you have any questions, ask your nurse or doctor.               Start taking these medicines.        Dose/Directions    varenicline 0.5 MG X 11 & 1 MG X 42 tablet   Commonly known as:  CHANTIX STARTING MONTH PAK   Used for:  Tobacco use disorder   Started by:  Maral Ybarra MD        Take 0.5 mg tab daily for 3 days, then 0.5 mg tab twice daily for 4 days, then 1 mg twice daily.   Quantity:  53 tablet   Refills:  0            Where to get your medicines      These medications were sent to [facility] [address]   [address] [facility] , [facility] [address]     Phone:  [phone]     varenicline 0.5 MG X 11 & 1 MG X 42 tablet                Primary Care Provider Fax #    Physician No Ref-Primary [phone]       No address on file        Equal Access to Services     J LUIS NUÑEZ AH: Hadii aad ku hadasho Soomaali, waaxda luqadaha, qaybta kaalmada adeegyada, mark fierro haykary cortez. So [facility] [phone].    ATENCIÓN: Si habla español, tiene a hatch disposición servicios gratuitos de asistencia lingüística. Llame al [phone].    We comply with applicable federal civil rights laws and [place] laws. We do not discriminate on the basis of race, color, national origin, age, disability, sex, sexual orientation, or gender identity.            Thank you!     Thank you for choosing [facility]  for your care. Our goal is always to provide you with excellent care. Hearing back from our patients is one way we can continue to improve our services. Please take a few minutes  to complete the written survey that you may receive in the mail after your visit with us. Thank you!             Your Updated Medication List - Protect others around you: Learn how to safely use, store and throw away your medicines at www.disposemymeds.org.          This list is accurate as of 4/3/18  5:26 PM.  Always use your most recent med list.                   Brand Name Dispense Instructions for use Diagnosis    varenicline 0.5 MG X 11 & 1 MG X 42 tablet    CHANTIX STARTING MONTH XAVIER    53 tablet    Take 0.5 mg tab daily for 3 days, then 0.5 mg tab twice daily for 4 days, then 1 mg twice daily.    Tobacco use disorder

## 2018-07-23 ENCOUNTER — OFFICE VISIT (OUTPATIENT)
Dept: URGENT CARE | Facility: RETAIL CLINIC | Age: 38
End: 2018-07-23
Payer: COMMERCIAL

## 2018-07-23 VITALS
OXYGEN SATURATION: 98 % | TEMPERATURE: 100 F | DIASTOLIC BLOOD PRESSURE: 71 MMHG | SYSTOLIC BLOOD PRESSURE: 112 MMHG | HEART RATE: 120 BPM

## 2018-07-23 DIAGNOSIS — R30.0 DYSURIA: Primary | ICD-10-CM

## 2018-07-23 DIAGNOSIS — N30.00 ACUTE CYSTITIS WITHOUT HEMATURIA: ICD-10-CM

## 2018-07-23 LAB
APPEARANCE UR: CLEAR
BILIRUB UR QL: ABNORMAL
COLOR UR: YELLOW
GLUCOSE URINE: ABNORMAL MG/DL
HGB UR QL: ABNORMAL
KETONES UR QL: ABNORMAL MG/DL
LEUKOCYTE ESTERASE URINE: ABNORMAL
NITRITE UR QL STRIP: ABNORMAL
PH UR STRIP: 6 PH (ref 5–7)
PROTEIN ALBUMIN URINE: 100 MG/DL
SOURCE: ABNORMAL
SP GR UR STRIP: 1.01 (ref 1–1.03)
UROBILINOGEN UR QL STRIP: 1 EU/DL (ref 0.2–1)

## 2018-07-23 PROCEDURE — 99203 OFFICE O/P NEW LOW 30 MIN: CPT | Performed by: FAMILY MEDICINE

## 2018-07-23 PROCEDURE — 81002 URINALYSIS NONAUTO W/O SCOPE: CPT | Mod: QW | Performed by: FAMILY MEDICINE

## 2018-07-23 PROCEDURE — 87186 SC STD MICRODIL/AGAR DIL: CPT | Performed by: FAMILY MEDICINE

## 2018-07-23 PROCEDURE — 87086 URINE CULTURE/COLONY COUNT: CPT | Performed by: FAMILY MEDICINE

## 2018-07-23 PROCEDURE — 87088 URINE BACTERIA CULTURE: CPT | Performed by: FAMILY MEDICINE

## 2018-07-23 RX ORDER — CIPROFLOXACIN 500 MG/1
500 TABLET, FILM COATED ORAL 2 TIMES DAILY
Qty: 14 TABLET | Refills: 0 | Status: SHIPPED | OUTPATIENT
Start: 2018-07-23 | End: 2019-07-03

## 2018-07-23 NOTE — MR AVS SNAPSHOT
After Visit Summary   7/23/2018    Lisa Combs    MRN: [id number]           Patient Information     Date Of Birth          1980        Visit Information        Provider Department      7/23/2018 9:10 AM Joaquin Roe MD [facility]        Today's Diagnoses     Dysuria    -  1    Acute cystitis without hematuria          Care Instructions      Anatomy of the Female Urinary Tract  Your urinary tract helps get rid of urine (your body s liquid waste). The kidneys collect chemicals and water your body doesn t need. This is turned into urine. Urine travels out of the kidneys through the ureters to the bladder. The bladder holds urine until you re ready to release it. The urethra carries urine from the bladder out of the body. The main sphincter muscle circles the mid-urethra.      Front view of female urinary tract.   Date Last Reviewed: 1/1/2017 2000-2017 [employer]. [address] [place] [address]. All rights reserved. This information is not intended as a substitute for professional medical care. Always follow your healthcare professional's instructions.                Follow-ups after your visit        Who to contact     You can reach your care team any time of the day by calling [phone].  Notification of test results:  If you have an abnormal lab result, we will notify you by phone as soon as possible.         Additional Information About Your Visit        MyChart Information     [facility] gives you secure access to your electronic health record. If you see a primary care provider, you can also send messages to your care team and make appointments. If you have questions, please call your primary care clinic.  If you do not have a primary care provider, please call [phone] and they will assist you.        Care EveryWhere ID     This is your Care EveryWhere ID. This could be used by other organizations to access your [facility]  records  [id number]        Your Vitals Were     Pulse Temperature Pulse Oximetry             120 100  F (37.8  C) 98%          Blood Pressure from Last 3 Encounters:   07/23/18 112/71   04/03/18 104/60   11/22/17 118/80    Weight from Last 3 Encounters:   04/03/18 124 lb 6.4 oz (56.4 kg)   11/22/17 130 lb 11.2 oz (59.3 kg)   08/01/17 130 lb 8 oz (59.2 kg)              We Performed the Following     HCL U/A, W/O MICRO, NON AUTO     Urine Culture Aerobic Bacterial          Today's Medication Changes          These changes are accurate as of 7/23/18  9:15 AM.  If you have any questions, ask your nurse or doctor.               Start taking these medicines.        Dose/Directions    ciprofloxacin 500 MG tablet   Commonly known as:  CIPRO   Used for:  Acute cystitis without hematuria   Started by:  Joaquin Roe MD        Dose:  500 mg   Take 1 tablet (500 mg) by mouth 2 times daily   Quantity:  14 tablet   Refills:  0            Where to get your medicines      These medications were sent to [facility] [place], [place] - [address] [facility]   [address] [facility] [address]     Phone:  [phone]     ciprofloxacin 500 MG tablet                Primary Care Provider Fax #    Physician No Ref-Primary [phone]       No address on file        Equal Access to Services     J LUIS NUÑEZ AH: Hadii hesham godoyo Soanita, waaxda luqadaha, qaybta kaalmada adeegyada, mark cortez. So [facility] [phone].    ATENCIÓN: Si habla español, tiene a hatch disposición servicios gratuitos de asistencia lingüística. Llame al [phone].    We comply with applicable federal civil rights laws and [place] laws. We do not discriminate on the basis of race, color, national origin, age, disability, sex, sexual orientation, or gender identity.            Thank you!     Thank you for choosing [facility]  for your care. Our goal is always to provide you with excellent care.  Hearing back from our patients is one way we can continue to improve our services. Please take a few minutes to complete the written survey that you may receive in the mail after your visit with us. Thank you!             Your Updated Medication List - Protect others around you: Learn how to safely use, store and throw away your medicines at www.disposemymeds.org.          This list is accurate as of 7/23/18  9:15 AM.  Always use your most recent med list.                   Brand Name Dispense Instructions for use Diagnosis    ciprofloxacin 500 MG tablet    CIPRO    14 tablet    Take 1 tablet (500 mg) by mouth 2 times daily    Acute cystitis without hematuria       varenicline 0.5 MG X 11 & 1 MG X 42 tablet    CHANTIX STARTING MONTH XAVIER    53 tablet    Take 0.5 mg tab daily for 3 days, then 0.5 mg tab twice daily for 4 days, then 1 mg twice daily.    Tobacco use disorder

## 2018-07-23 NOTE — PATIENT INSTRUCTIONS
Anatomy of the Female Urinary Tract  Your urinary tract helps get rid of urine (your body s liquid waste). The kidneys collect chemicals and water your body doesn t need. This is turned into urine. Urine travels out of the kidneys through the ureters to the bladder. The bladder holds urine until you re ready to release it. The urethra carries urine from the bladder out of the body. The main sphincter muscle circles the mid-urethra.      Front view of female urinary tract.   Date Last Reviewed: 1/1/2017 2000-2017 [employer]. [address] [place] [address], [place] [address]. All rights reserved. This information is not intended as a substitute for professional medical care. Always follow your healthcare professional's instructions.

## 2018-07-24 LAB
BACTERIA SPEC CULT: ABNORMAL
Lab: ABNORMAL
SPECIMEN SOURCE: ABNORMAL

## 2019-07-03 ENCOUNTER — OFFICE VISIT (OUTPATIENT)
Dept: FAMILY MEDICINE | Facility: CLINIC | Age: 39
End: 2019-07-03
Payer: COMMERCIAL

## 2019-07-03 VITALS
TEMPERATURE: 97.6 F | WEIGHT: 126.8 LBS | BODY MASS INDEX: 21.65 KG/M2 | OXYGEN SATURATION: 99 % | DIASTOLIC BLOOD PRESSURE: 76 MMHG | RESPIRATION RATE: 18 BRPM | HEIGHT: 64 IN | HEART RATE: 91 BPM | SYSTOLIC BLOOD PRESSURE: 120 MMHG

## 2019-07-03 DIAGNOSIS — Z30.09 BIRTH CONTROL COUNSELING: ICD-10-CM

## 2019-07-03 DIAGNOSIS — L91.8 SKIN TAG: ICD-10-CM

## 2019-07-03 DIAGNOSIS — I78.1 NEVUS, NON-NEOPLASTIC: Primary | ICD-10-CM

## 2019-07-03 DIAGNOSIS — F17.200 TOBACCO USE DISORDER: ICD-10-CM

## 2019-07-03 PROCEDURE — 11200 RMVL SKIN TAGS UP TO&INC 15: CPT | Performed by: FAMILY MEDICINE

## 2019-07-03 PROCEDURE — 11440 EXC FACE-MM B9+MARG 0.5 CM/<: CPT | Mod: 59 | Performed by: FAMILY MEDICINE

## 2019-07-03 PROCEDURE — 99213 OFFICE O/P EST LOW 20 MIN: CPT | Mod: 25 | Performed by: FAMILY MEDICINE

## 2019-07-03 ASSESSMENT — PAIN SCALES - GENERAL: PAINLEVEL: NO PAIN (0)

## 2019-07-03 ASSESSMENT — MIFFLIN-ST. JEOR: SCORE: 1245.16

## 2019-07-03 NOTE — PROGRESS NOTES
"Subjective     Lisa Combs is a 39 year old female who presents to clinic today for the following health issues:    HPI   Chief Complaint   Patient presents with     Mole     multiple mole removals     Lisa is here today for couple concerns.  First is to have the mole on her face laterally to the left eye to be removed.  She was seen for this about a years ago.  Has it for years and overall she thinks is getting bigger slowly.  It started affecting her vision laterally to the left.  No change in pigmentation.  No family or personal history is of skin cancer.  It also gets irritated easily when she accidentally rub on it.    She also would like to have skin tags removed.  She has several of them on neck that get irritated and painful when they get caught on jewelry or clothes.  She has one on her right armpit that get nicked in the frequently with shaving.  No change in pigmentation.    Also wondering if she can get the Depo-Provera.  She is status post tubal ligation.  To use it to managed her menstrual bleeding.  Last menstrual period was 2 weeks ago.  They are not too heavy and have been regular; just does not like having menstrual period.  Last intercourse witho protection was a week ago.  Tried the Depo Provera in the past and it worked well for her without side effect.  No significant weight gain from it.  No personal or family history is of blood clot.  She smokes about half a pack a day.      No current outpatient medications on file.     Allergies   Allergen Reactions     No Known Drug Allergies        Reviewed and updated as needed this visit by Provider         Review of Systems   ROS COMP: Constitutional, HEENT, cardiovascular, pulmonary, gi and gu systems are negative, except as otherwise noted.      Objective    /76   Pulse 91   Temp 97.6  F (36.4  C) (Temporal)   Resp 18   Ht 1.626 m (5' 4\")   Wt 57.5 kg (126 lb 12.8 oz)   LMP 06/16/2019 (Exact Date)   SpO2 99%   BMI 21.77 kg/m  "   Body mass index is 21.77 kg/m .  Physical Exam   GENERAL: healthy, alert and no distress  EYES: Eyes grossly normal to inspection, PERRL and conjunctivae and sclerae normal  NECK: no adenopathy  RESP: lungs clear to auscultation - no rales, rhonchi or wheezes  CV: regular rate and rhythm, no murmur  SKIN: 5 the skin tag was noted on the neck.  The other is in size but typically they are quite small.  There is an irritated skin tag underneath her right armpit.  No sign of active infection.  Light brownish nodule that is 0.3 cm in diameter on the face, about 1.5 cm lateral to the right left eye.  It is well demarcated and symmetrical border, benign looking in nature.  A similar nodule,, about 0.5 cm diameter on the right chin with no pigmentation is noted.    Diagnostic Test Results:  Labs reviewed in Epic  No results found for this or any previous visit (from the past 24 hour(s)).        Assessment & Plan     1. Nevus, non-neoplastic  She has 2 benign looking nevi on the face.  No problem or concerns with the one on her right chin - stable with no change in size or pigmentation.  The one on the face lateral to the left eye has changed in size and started affecting her vision laterally to the left.  They both look benign in nature.  Discussed with her about the nature condition.  Will continue monitoring the one on the right chin for now.  Instructed to follow-up if notes changing in size, shape or color.  Also discussed with her about ABCD.      The one on the face lateral to the left eye has caused on and off irritations and is affecting her vision, as per her request, it was removed today.  She tolerated procedure well with no complication.  Please see the procedure note for further details.  Recommend to send the tissue for pathology study but she declined.  It is again most likely benign in nature.    2. Skin tag  Discussed with her about the nature of the conditions and she was reassured that they are benign  in nature.  Since they have been causing irritations and pain on and off, they were removed off today.  Please see the procedure note for further details.    3. Tobacco use disorder  Lisa has been smoking for years.  Discussed with her about the long and short term consequences of tobacco smoking.  Discussed with her about different options for smoking cessation aids.  She is not ready to quit smoking at this time.  Encourage to let me know when she is ready to quit.  In a mean time, I encourage her to reduce to amount of cigarrets smoke as much as possible.  All of her questions were answered.    4. Birth control counseling  Discussed with her about different options which include Depo-Provera, Nexplanon and IUD.  She is s/p tubal ligation.  She wants Depo-Provera to control her menstrual cycle as it worked well for her in the past.  Despite the fact that she has her tubes tied, I still recommend a pregnancy test before the Depo-Provera.  Her last menstrual period was couple weeks ago.  Pregnancy test was sent and encouraged her to get it done when she can and then stop by the clinic with nursing visit for Depo-Provera injection if it is negative.  Potential side effects also discussed.    - HCG Qual, Urine ([id number]); Future     Tobacco Cessation:   reports that she has been smoking cigarettes.  She has a 10.50 pack-year smoking history. She has never used smokeless tobacco.  Tobacco Cessation Action Plan: Information offered: Patient not interested at this time      Return in about 3 months (around 10/3/2019) for Physical Exam.    Maral Augustine Mai, MD  [facility]

## 2019-07-04 NOTE — PROCEDURES
Procedure:  Excision of a mole on the face lateral to the left eye     Indication: Effecting her left lateral vision and it seems to get bigger slightly      The who procedure was discussed with patient in details.  Risk associated with the procedure was also discussed.  Educated alternative options including referral to dermatology. Also informed about a high potential of scarring to the area.  She elected to have it remove today.      Time out performed - patient was identified time 2.  Name and location of the      The lesion that is 0.3 cm in diameter on the face that is about 1.5 cm lateral to the left eye was cleaned with alcohol swab and locally anesthetized with lidocaine with epi, about 1ml were used. Good anesthesia noted.   The area was then cleaned with alcohol in the usual fashion.  The whole procedure was performed in usual sterile manner - using fenestrated drape.  Blade #15 was used to make an eclipse incision - 1x0.4 cm in dimension with no complication. The lesion was then removed in the usual manner, using the  and blade # 15. The incision was then sutured with 4.0 Vicryl, 3 interrupted sutures placed.        The excisional area was then cleaned with normal saline.  Appropriate dressing applied. The procedure was well tolerated and without complications. Specimen was disposed as per her request.     Post procedure and wound care discussed . Encouraged to monitor for sign of infections.  Follow-up in 7 days for suture removal, earlier as needed.     Maral Ybarra MD.

## 2019-07-04 NOTE — PROCEDURES
Procedure: Skin tags removal    Indication: multiple skin tags that cause irritation on and off    The whole procedure discussed with Lisa in details.  Risks associated with the procedure discussed.  Alternatives was also discussed. She requested to have the lesion excised today and the consent was obtained.    Patient was identified times three.  Name and location of the procedure was also discussed.    The whole procedure was done in a usual sterile manner.  The area was cleaned with alcohol swab,  1 ml of Lidocaine with epi injected directed at the base of 6 skin tag.  Good anesthesia noted.  The area was then clean with alcohol swab again.  The  was used to grab the skin tag and the scissors were used to trim the skin tag off at its base.  Silver nitrate applied.  Minimal bleeding.  She tolerated the procedure well.  EBL was less than 1 ml.    Post procedural care discussed. Encourage to keep the wound clean. Educate symptoms to call in or be seen.  Did not sent out for pathology study due to its benign nature.    Maral Ybarra MD.

## 2019-07-08 ENCOUNTER — ALLIED HEALTH/NURSE VISIT (OUTPATIENT)
Dept: FAMILY MEDICINE | Facility: CLINIC | Age: 39
End: 2019-07-08
Payer: COMMERCIAL

## 2019-07-08 DIAGNOSIS — Z48.02 VISIT FOR SUTURE REMOVAL: Primary | ICD-10-CM

## 2019-07-08 PROCEDURE — 99207 ZZC NO CHARGE NURSE ONLY: CPT

## 2019-07-08 NOTE — NURSING NOTE
Lisa Combs presents to the clinic today for  removal of sutures.  The patient has had the sutures in place for 5 days.    There has been no history of infection or drainage.    O: 3 sutures are seen located by the left eye.  The wound is healing well with no signs of infection.    Tetanus status is up to date.    A: Suture removal.    P:  All sutures were easily removed today.  Routine wound care discussed.  The patient will follow up as needed.    Patient expressed verbal understand of wound care.     Stephania Vicente RN

## 2019-07-10 ENCOUNTER — ALLIED HEALTH/NURSE VISIT (OUTPATIENT)
Dept: FAMILY MEDICINE | Facility: CLINIC | Age: 39
End: 2019-07-10
Payer: COMMERCIAL

## 2019-07-10 DIAGNOSIS — Z30.013 ENCOUNTER FOR INITIAL PRESCRIPTION OF INJECTABLE CONTRACEPTIVE: Primary | ICD-10-CM

## 2019-07-10 DIAGNOSIS — Z30.09 BIRTH CONTROL COUNSELING: Primary | ICD-10-CM

## 2019-07-10 DIAGNOSIS — Z30.09 BIRTH CONTROL COUNSELING: ICD-10-CM

## 2019-07-10 LAB — HCG UR QL: NEGATIVE

## 2019-07-10 PROCEDURE — 81025 URINE PREGNANCY TEST: CPT | Performed by: FAMILY MEDICINE

## 2019-07-10 PROCEDURE — 96372 THER/PROPH/DIAG INJ SC/IM: CPT

## 2019-07-10 PROCEDURE — 99207 ZZC NO CHARGE NURSE ONLY: CPT

## 2019-07-10 RX ORDER — MEDROXYPROGESTERONE ACETATE 150 MG/ML
INJECTION, SUSPENSION INTRAMUSCULAR
Qty: 1 ML | Refills: 4 | OUTPATIENT
Start: 2019-07-10 | End: 2020-11-23

## 2019-07-10 RX ORDER — MEDROXYPROGESTERONE ACETATE 150 MG/ML
150 INJECTION, SUSPENSION INTRAMUSCULAR
Status: COMPLETED | OUTPATIENT
Start: 2019-07-10 | End: 2020-03-10

## 2019-07-10 RX ADMIN — MEDROXYPROGESTERONE ACETATE 150 MG: 150 INJECTION, SUSPENSION INTRAMUSCULAR at 11:00

## 2019-07-10 NOTE — NURSING NOTE
Clinic Administered Medication Documentation      Depo Provera Documentation    Prior to injection, verified patient identity using patient's name and date of birth. Medication was administered. Please see MAR and medication order for additional information. Patient instructed to remain in clinic for 15 minutes and report any adverse reaction to staff immediately .    BP: Data Unavailable    LAST PAP/EXAM:   Lab Results   Component Value Date    PAP NIL 08/01/2017     URINE HCG:negative    NEXT INJECTION DUE: 9/25/19 - 10/9/19    Was entire vial of medication used? Yes  Vial/Syringe: Single dose vial  Expiration Date:  06/20  Alia Duffy CMA

## 2019-09-28 ENCOUNTER — HEALTH MAINTENANCE LETTER (OUTPATIENT)
Age: 39
End: 2019-09-28

## 2019-10-01 ENCOUNTER — ALLIED HEALTH/NURSE VISIT (OUTPATIENT)
Dept: FAMILY MEDICINE | Facility: CLINIC | Age: 39
End: 2019-10-01
Payer: COMMERCIAL

## 2019-10-01 DIAGNOSIS — Z30.013 ENCOUNTER FOR INITIAL PRESCRIPTION OF INJECTABLE CONTRACEPTIVE: Primary | ICD-10-CM

## 2019-10-01 PROCEDURE — 96372 THER/PROPH/DIAG INJ SC/IM: CPT

## 2019-10-01 PROCEDURE — 99207 ZZC NO CHARGE NURSE ONLY: CPT

## 2019-10-01 RX ADMIN — MEDROXYPROGESTERONE ACETATE 150 MG: 150 INJECTION, SUSPENSION INTRAMUSCULAR at 14:23

## 2019-10-01 NOTE — PROGRESS NOTES
Clinic Administered Medication Documentation    MEDICATION LIST:   Depo Provera Documentation    Prior to injection, verified patient identity using patient's name and date of birth. Medication was administered. Please see MAR and medication order for additional information. Patient instructed to remain in clinic for 15 minutes.    BP: Data Unavailable    LAST PAP/EXAM:   Lab Results   Component Value Date    PAP NIL 08/01/2017     URINE HCG:not indicated    NEXT INJECTION DUE: 12/17/19 - 12/31/19    Was entire vial of medication used? Yes  Vial/Syringe: Single dose vial  Expiration Date:  06/20

## 2019-12-17 ENCOUNTER — ALLIED HEALTH/NURSE VISIT (OUTPATIENT)
Dept: FAMILY MEDICINE | Facility: CLINIC | Age: 39
End: 2019-12-17
Payer: COMMERCIAL

## 2019-12-17 DIAGNOSIS — Z30.013 ENCOUNTER FOR INITIAL PRESCRIPTION OF INJECTABLE CONTRACEPTIVE: Primary | ICD-10-CM

## 2019-12-17 PROCEDURE — 96372 THER/PROPH/DIAG INJ SC/IM: CPT

## 2019-12-17 PROCEDURE — 99207 ZZC NO CHARGE NURSE ONLY: CPT

## 2019-12-17 RX ADMIN — MEDROXYPROGESTERONE ACETATE 150 MG: 150 INJECTION, SUSPENSION INTRAMUSCULAR at 15:08

## 2019-12-17 NOTE — PROGRESS NOTES
Clinic Administered Medication Documentation    MEDICATION LIST:   Depo Provera Documentation    Prior to injection, verified patient identity using patient's name and date of birth. Medication was administered. Please see MAR and medication order for additional information. Patient instructed to remain in clinic for 15 minutes.    BP: Data Unavailable    LAST PAP/EXAM:   Lab Results   Component Value Date    PAP NIL 08/01/2017     URINE HCG:not indicated    NEXT INJECTION DUE: 3/3/20 - 3/17/20    Was entire vial of medication used? Yes  Vial/Syringe: Single dose vial  Expiration Date:  07/20      Tierney Muniz MA 12/17/2019

## 2020-02-04 ENCOUNTER — MYC MEDICAL ADVICE (OUTPATIENT)
Dept: FAMILY MEDICINE | Facility: CLINIC | Age: 40
End: 2020-02-04

## 2020-03-10 ENCOUNTER — ALLIED HEALTH/NURSE VISIT (OUTPATIENT)
Dept: FAMILY MEDICINE | Facility: CLINIC | Age: 40
End: 2020-03-10
Payer: COMMERCIAL

## 2020-03-10 DIAGNOSIS — Z30.013 ENCOUNTER FOR INITIAL PRESCRIPTION OF INJECTABLE CONTRACEPTIVE: Primary | ICD-10-CM

## 2020-03-10 PROCEDURE — 96372 THER/PROPH/DIAG INJ SC/IM: CPT

## 2020-03-10 PROCEDURE — 99207 ZZC NO CHARGE NURSE ONLY: CPT

## 2020-03-10 RX ADMIN — MEDROXYPROGESTERONE ACETATE 150 MG: 150 INJECTION, SUSPENSION INTRAMUSCULAR at 14:12

## 2020-03-10 NOTE — PROGRESS NOTES
BP: Data Unavailable    LAST PAP/EXAM:   Lab Results   Component Value Date    PAP NIL 08/01/2017     URINE HCG:not indicated    The following medication was given:     MEDICATION: Depo Provera 150mg  ROUTE: IM  SITE: [facility]  : [employer]  LOT #: [id number]  EXP:07/20  NEXT INJECTION DUE: 5/26/20 - 6/9/20   Provider: Maral Ybarra MD

## 2020-06-01 ENCOUNTER — ALLIED HEALTH/NURSE VISIT (OUTPATIENT)
Dept: FAMILY MEDICINE | Facility: CLINIC | Age: 40
End: 2020-06-01
Payer: COMMERCIAL

## 2020-06-01 VITALS — SYSTOLIC BLOOD PRESSURE: 118 MMHG | DIASTOLIC BLOOD PRESSURE: 60 MMHG

## 2020-06-01 DIAGNOSIS — Z30.013 ENCOUNTER FOR INITIAL PRESCRIPTION OF INJECTABLE CONTRACEPTIVE: Primary | ICD-10-CM

## 2020-06-01 PROCEDURE — 96372 THER/PROPH/DIAG INJ SC/IM: CPT

## 2020-06-01 RX ORDER — MEDROXYPROGESTERONE ACETATE 150 MG/ML
150 INJECTION, SUSPENSION INTRAMUSCULAR ONCE
Status: COMPLETED | OUTPATIENT
Start: 2020-06-01 | End: 2020-06-01

## 2020-06-01 RX ADMIN — MEDROXYPROGESTERONE ACETATE 150 MG: 150 INJECTION, SUSPENSION INTRAMUSCULAR at 11:39

## 2020-06-01 NOTE — NURSING NOTE
Dr. Sutton gave a 1 time order for Depo Provera injection.    Prior to injection verified patient identity using patient's name and date of birth.  Patient instructed to wait 15-20 minutes after injection and to report any adverse reactions.

## 2020-06-01 NOTE — PROGRESS NOTES
Clinic Administered Medication Documentation      Depo Provera Documentation    URINE HCG: not indicated    Depo-Provera Standing Order inclusion/exclusion criteria reviewed.   Patient meets: inclusion criteria     BP: 118/60  LAST PAP/EXAM:   Lab Results   Component Value Date    PAP NIL 08/01/2017       Prior to injection, verified patient identity using patient's name and date of birth. Medication was administered. Please see MAR and medication order for additional information.     Was entire vial of medication used? Yes  Vial/Syringe: Single dose vial  Expiration Date:  3-2021    Patient instructed to remain in clinic for 15 minutes.  NEXT INJECTION DUE: 8/17/20 - 8/31/20

## 2020-08-19 ENCOUNTER — TELEPHONE (OUTPATIENT)
Dept: FAMILY MEDICINE | Facility: CLINIC | Age: 40
End: 2020-08-19

## 2020-08-19 DIAGNOSIS — Z30.019 ENCOUNTER FOR FEMALE BIRTH CONTROL: Primary | ICD-10-CM

## 2020-08-19 RX ORDER — MEDROXYPROGESTERONE ACETATE 150 MG/ML
150 INJECTION, SUSPENSION INTRAMUSCULAR ONCE
Status: COMPLETED | OUTPATIENT
Start: 2020-08-19 | End: 2020-08-20

## 2020-08-19 NOTE — TELEPHONE ENCOUNTER
Patient is coming in tomorrow. Patient was given a one time order on 6/1/2020 and she hasnt been seen. Will you give another 1 time and have patient schedule follow up or do you want her to be seen first? She has time to see PCP first  NEXT INJECTION DUE: 8/17/20 - 8/31/20    Please advise?    Tierney Muniz MA 8/19/2020

## 2020-08-19 NOTE — TELEPHONE ENCOUNTER
Approved one more time.  Please schedule her to follow up for physical in the next 1-2 months.  Need pap as well.  thanks

## 2020-08-20 ENCOUNTER — ALLIED HEALTH/NURSE VISIT (OUTPATIENT)
Dept: FAMILY MEDICINE | Facility: CLINIC | Age: 40
End: 2020-08-20
Payer: COMMERCIAL

## 2020-08-20 DIAGNOSIS — Z30.019 ENCOUNTER FOR FEMALE BIRTH CONTROL: Primary | ICD-10-CM

## 2020-08-20 LAB — HCG UR QL: NEGATIVE

## 2020-08-20 PROCEDURE — 81025 URINE PREGNANCY TEST: CPT | Performed by: FAMILY MEDICINE

## 2020-08-20 PROCEDURE — 96372 THER/PROPH/DIAG INJ SC/IM: CPT

## 2020-08-20 PROCEDURE — 99207 ZZC NO CHARGE NURSE ONLY: CPT

## 2020-08-20 RX ADMIN — MEDROXYPROGESTERONE ACETATE 150 MG: 150 INJECTION, SUSPENSION INTRAMUSCULAR at 13:37

## 2020-08-20 NOTE — PROGRESS NOTES
Clinic Administered Medication Documentation      Depo Provera Documentation    URINE HCG: negative    Depo-Provera Standing Order inclusion/exclusion criteria reviewed.   Patient meets: inclusion criteria     BP: Data Unavailable  LAST PAP/EXAM:   Lab Results   Component Value Date    PAP NIL 08/01/2017       Prior to injection, verified patient identity using patient's name and date of birth. Medication was administered. Please see MAR and medication order for additional information.     Was entire vial of medication used? Yes  Vial/Syringe: Single dose vial  Expiration Date:  02/2022    Patient instructed to remain in clinic for 15 minutes and report any adverse reaction to staff immediately .  NEXT INJECTION DUE: 11/5/20 - 11/19/20

## 2020-11-06 ENCOUNTER — OFFICE VISIT (OUTPATIENT)
Dept: FAMILY MEDICINE | Facility: CLINIC | Age: 40
End: 2020-11-06
Payer: COMMERCIAL

## 2020-11-06 VITALS
HEART RATE: 76 BPM | RESPIRATION RATE: 12 BRPM | OXYGEN SATURATION: 99 % | WEIGHT: 144.2 LBS | TEMPERATURE: 98 F | SYSTOLIC BLOOD PRESSURE: 104 MMHG | DIASTOLIC BLOOD PRESSURE: 68 MMHG | HEIGHT: 64 IN | BODY MASS INDEX: 24.62 KG/M2

## 2020-11-06 DIAGNOSIS — F17.200 TOBACCO USE DISORDER: ICD-10-CM

## 2020-11-06 DIAGNOSIS — Z87.42 HX OF ABNORMAL CERVICAL PAP SMEAR: ICD-10-CM

## 2020-11-06 DIAGNOSIS — Z00.00 ROUTINE GENERAL MEDICAL EXAMINATION AT A HEALTH CARE FACILITY: Primary | ICD-10-CM

## 2020-11-06 DIAGNOSIS — R79.89 ABNORMAL LIVER FUNCTION TESTS: ICD-10-CM

## 2020-11-06 LAB
ALBUMIN SERPL-MCNC: 3.9 G/DL (ref 3.4–5)
ALP SERPL-CCNC: 105 U/L (ref 40–150)
ALT SERPL W P-5'-P-CCNC: 39 U/L (ref 0–50)
ANION GAP SERPL CALCULATED.3IONS-SCNC: 6 MMOL/L (ref 3–14)
AST SERPL W P-5'-P-CCNC: 14 U/L (ref 0–45)
BILIRUB SERPL-MCNC: 0.4 MG/DL (ref 0.2–1.3)
BUN SERPL-MCNC: 6 MG/DL (ref 7–30)
CALCIUM SERPL-MCNC: 9 MG/DL (ref 8.5–10.1)
CHLORIDE SERPL-SCNC: 108 MMOL/L (ref 94–109)
CHOLEST SERPL-MCNC: 156 MG/DL
CO2 SERPL-SCNC: 26 MMOL/L (ref 20–32)
CREAT SERPL-MCNC: 0.75 MG/DL (ref 0.52–1.04)
GFR SERPL CREATININE-BSD FRML MDRD: >90 ML/MIN/{1.73_M2}
GLUCOSE SERPL-MCNC: 85 MG/DL (ref 70–99)
HDLC SERPL-MCNC: 38 MG/DL
LDLC SERPL CALC-MCNC: 84 MG/DL
NONHDLC SERPL-MCNC: 118 MG/DL
POTASSIUM SERPL-SCNC: 4.1 MMOL/L (ref 3.4–5.3)
PROT SERPL-MCNC: 7.6 G/DL (ref 6.8–8.8)
SODIUM SERPL-SCNC: 140 MMOL/L (ref 133–144)
TRIGL SERPL-MCNC: 172 MG/DL
TSH SERPL DL<=0.005 MIU/L-ACNC: 1.11 MU/L (ref 0.4–4)

## 2020-11-06 PROCEDURE — 80053 COMPREHEN METABOLIC PANEL: CPT | Performed by: FAMILY MEDICINE

## 2020-11-06 PROCEDURE — 84443 ASSAY THYROID STIM HORMONE: CPT | Performed by: FAMILY MEDICINE

## 2020-11-06 PROCEDURE — G0145 SCR C/V CYTO,THINLAYER,RESCR: HCPCS | Performed by: FAMILY MEDICINE

## 2020-11-06 PROCEDURE — 86803 HEPATITIS C AB TEST: CPT | Performed by: FAMILY MEDICINE

## 2020-11-06 PROCEDURE — 99395 PREV VISIT EST AGE 18-39: CPT | Performed by: FAMILY MEDICINE

## 2020-11-06 PROCEDURE — 36415 COLL VENOUS BLD VENIPUNCTURE: CPT | Performed by: FAMILY MEDICINE

## 2020-11-06 PROCEDURE — 80061 LIPID PANEL: CPT | Performed by: FAMILY MEDICINE

## 2020-11-06 PROCEDURE — 87624 HPV HI-RISK TYP POOLED RSLT: CPT | Performed by: FAMILY MEDICINE

## 2020-11-06 ASSESSMENT — ENCOUNTER SYMPTOMS
HEADACHES: 0
HEMATOCHEZIA: 0
PARESTHESIAS: 0
NAUSEA: 0
CHILLS: 0
FEVER: 0
ARTHRALGIAS: 0
DIARRHEA: 0
WEAKNESS: 0
COUGH: 0
DIZZINESS: 0
FREQUENCY: 0
HEARTBURN: 0
DYSURIA: 0
HEMATURIA: 0
NERVOUS/ANXIOUS: 0
BREAST MASS: 0
MYALGIAS: 0
SHORTNESS OF BREATH: 0
CONSTIPATION: 0
ABDOMINAL PAIN: 0
JOINT SWELLING: 0
SORE THROAT: 0
EYE PAIN: 0
PALPITATIONS: 0

## 2020-11-06 ASSESSMENT — MIFFLIN-ST. JEOR: SCORE: 1317.5

## 2020-11-06 ASSESSMENT — PAIN SCALES - GENERAL: PAINLEVEL: NO PAIN (0)

## 2020-11-06 NOTE — PROGRESS NOTES
SUBJECTIVE:   CC: Lisa Combs is an 40 year old woman who presents for preventive health visit.       Patient has been advised of split billing requirements and indicates understanding: No  Healthy Habits:     Getting at least 3 servings of Calcium per day:  NO    Bi-annual eye exam:  Yes    Dental care twice a year:  Yes    Sleep apnea or symptoms of sleep apnea:  None    Diet:  Regular (no restrictions)    Frequency of exercise:  1 day/week    Duration of exercise:  Less than 15 minutes    Taking medications regularly:  Yes    Medication side effects:  None    PHQ-2 Total Score: 2    Additional concerns today:  No        Today's PHQ-2 Score:   PHQ-2 ( 1999 [employer]) 11/6/2020   Q1: Little interest or pleasure in doing things 1   Q2: Feeling down, depressed or hopeless 1   PHQ-2 Score 2   Q1: Little interest or pleasure in doing things Several days   Q2: Feeling down, depressed or hopeless Several days   PHQ-2 Score 2       Abuse: Current or Past (Physical, Sexual or Emotional) - No  Do you feel safe in your environment? Yes    Lisa is here today for her general physical and general follow up. Stated that overall she doing well and has no concern today.  Her last physical exam was a year ago and it was normal; no major medical care or procedure done since then.  Depo provera injection for BC for years and it has been working well.  Been spotting on and off with the Depo - unclear LMP.  No concern of STD.  Had ACUS with HR HPV in 2006 and 2009.  Reported that she had a LEEP done when she was 19 (no document available).  Pap smear has been normal since then.  Her last pap was 3 years and it was normal.  No headache or dizziness.  No acute visual change. No running nose, nasal congestion, coughing, fever or chill. No CP/SOB. No N/V/D/C. No problem with urination.  No abdominal pain.  No leg swelling or pain. Stated that she exercises with walking daily.   Social alcohol drinker, no drug but smoke 1//2 ppd -  not ready to quit.  No problem with sleeping - no depression or anxiety.  Feels safe at home and at work.  Lives with her boyfriend. No other concern today.     Social History     Tobacco Use     Smoking status: Current Every Day Smoker     Packs/day: 0.50     Years: 21.00     Pack years: 10.50     Types: Cigarettes     Smokeless tobacco: Never Used     Tobacco comment: started age 16   Substance Use Topics     Alcohol use: Yes     Comment: socially         Alcohol Use 11/6/2020   Prescreen: >3 drinks/day or >7 drinks/week? No   Prescreen: >3 drinks/day or >7 drinks/week? -       Reviewed orders with patient.  Reviewed health maintenance and updated orders accordingly - Yes      Mammogram not appropriate for this patient based on age.    Pertinent mammograms are reviewed under the imaging tab.  History of abnormal Pap smear: NO - age 30- 65 PAP every 3 years recommended  PAP / HPV Latest Ref Rng & Units 8/1/2017 8/19/2011 9/3/2009   PAP - NIL NIL NIL   HPV 16 DNA NEG Negative - -   HPV 18 DNA NEG Negative - -   OTHER HR HPV NEG Negative - -     Reviewed and updated as needed this visit by clinical staff                 Reviewed and updated as needed this visit by Provider                Past Medical History:   Diagnosis Date     ASCUS on Pap smear 9/8/08, 1/20/09    + high risk HPV (done at outside facility)     History of colposcopy with cervical biopsy 10/11/01, 9/3/09    2001-biopsy = SYDNIE 2.  2009-negative     LSIL (low grade squamous intraepithelial lesion) on Pap smear 5/10/01     MEDICAL HISTORY OF -     abnl paps      Past Surgical History:   Procedure Laterality Date     CHOLECYSTECTOMY       HC COLP CERVIX/UPPER VAGINA W LOOP ELEC BX CERVIX  2001     LEEP TX, CERVICAL  2002     LEEP TX, CERVICAL       TUBAL LIGATION         Review of Systems   Constitutional: Negative for chills and fever.   HENT: Negative for congestion, ear pain, hearing loss and sore throat.    Eyes: Negative for pain and visual  "disturbance.   Respiratory: Negative for cough and shortness of breath.    Cardiovascular: Negative for chest pain, palpitations and peripheral edema.   Gastrointestinal: Negative for abdominal pain, constipation, diarrhea, heartburn, hematochezia and nausea.   Breasts:  Negative for tenderness, breast mass and discharge.   Genitourinary: Negative for dysuria, frequency, genital sores, hematuria, pelvic pain, urgency, vaginal bleeding and vaginal discharge.   Musculoskeletal: Negative for arthralgias, joint swelling and myalgias.   Skin: Negative for rash.   Neurological: Negative for dizziness, weakness, headaches and paresthesias.   Psychiatric/Behavioral: Negative for mood changes. The patient is not nervous/anxious.           OBJECTIVE:   /68   Pulse 76   Temp 98  F (36.7  C) (Temporal)   Resp 12   Ht 1.623 m (5' 3.9\")   Wt 65.4 kg (144 lb 3.2 oz)   LMP 10/16/2020 (Approximate)   SpO2 99%   Breastfeeding No   BMI 24.83 kg/m       Physical Exam   GENERAL: healthy, alert and no distress.  Speaking in full sentences.  EYES: Eyes grossly normal to inspection, PERRL and conjunctivae and sclerae normal.  No nystagmus.  All 4 visual fields intact.  HENT: ear canals and TM's normal.  Nares are non-congested. Oropharynx is pink and moist. No tender with palpation to the sinuses.   NECK: no adenopathy, supple, no lymphadenopathy or thyromegaly.  No tender with palpation to the cervical spine or its paraspinous muscle.  RESP: lungs clear to auscultation - no rales, rhonchi or wheezes.  Good respiratory effort throughout.  BREAST: Offered but she declined.  She has no concern about it.  CV: regular rate and rhythm, no murmur  ABDOMEN: soft, nontender, nondistended, no palpable masses organomegaly with normal culture.  No CVA tenderness.   (female): normal female external genitalia, normal urethral meatus, vaginal mucosa, normal cervix/adnexa/uterus without masses or discharge. Pending for pap and HPV.  No " obvious scar on the cervix from LEEP appreciated.  MS: no gross musculoskeletal defects noted, no edema. All joints are in the normal range of motion and 4 extremities were equally in strength. Hips, knees, ankles, shoulders, elbows, wrists exams were normal. Fine motor skills of the fingers are intact. Back is straight, no tender with palpation to the spine.  SKIN: no suspicious lesions or rashes  NEURO: Normal strength and tone, mentation intact and speech normal.  Cranial nerve II through XII intact.  DTRs +2 throughout.  No focal neurological deficit.  PSYCH: mentation appears normal, affect normal/bright.  Thoughts intact, suicidal/homicidal ideation.  No hallucination.      Diagnostic Test Results:  Labs reviewed in Epic  Results for orders placed or performed in visit on 11/06/20   Pap imaged thin layer screen with HPV - recommended age 30 - 65     Status: None   Result Value Ref Range    PAP NIL     Copath Report         Patient Name: GEOVANY JOHNSTON  MR#: [id number]  Specimen #: [id number]  Collected: 11/6/2020  Received: 11/9/2020  Reported: 11/10/2020 08:45  Ordering Phy(s): MICK CABALLERO MAI    For improved result formatting, select 'View Enhanced Report Format' under   Linked Documents section.    SPECIMEN/STAIN PROCESS:  Pap imaged thin layer prep screening (Surepath, FocalPoint with guided   screening)       Pap-Cyto x 1, HPV ordered x 1    SOURCE: Cervical  ----------------------------------------------------------------   Pap imaged thin layer prep screening (Surepath, FocalPoint with guided   screening)  SPECIMEN ADEQUACY:  Satisfactory for evaluation.  -Transformation zone component present.    CYTOLOGIC INTERPRETATION:    Negative for intraepithelial lesion or malignancy    Electronically signed out by:  LEN Christensen (ASCP)    CLINICAL HISTORY:    A previous normal pap  Date of Last Pap: 08/01/2017,    Papanicolaou Test Limitations:  Cervical cytology is a screening test with   limited s  ensitivity; regular  screening is critical for cancer prevention; Pap tests are primarily   effective for the diagnosis/prevention of  squamous cell carcinoma, not adenocarcinomas or other cancers.    COLLECTION SITE:  Client:  [facility]  Location: [facility] (P)    The technical component of this testing was completed at the [facility], with the professional component performed   at the [facility], [address] [place] [id number] ([phone])       HPV High Risk Types DNA Cervical     Status: None   Result Value Ref Range    HPV Source SurePath     HPV 16 DNA Negative NEG^Negative    HPV 18 DNA Negative NEG^Negative    Other HR HPV Negative NEG^Negative    Final Diagnosis This patient's sample is negative for HPV DNA.     Specimen Description Cervical Cells    Comprehensive metabolic panel     Status: Abnormal   Result Value Ref Range    Sodium 140 133 - 144 mmol/L    Potassium 4.1 3.4 - 5.3 mmol/L    Chloride 108 94 - 109 mmol/L    Carbon Dioxide 26 20 - 32 mmol/L    Anion Gap 6 3 - 14 mmol/L    Glucose 85 70 - 99 mg/dL    Urea Nitrogen 6 (L) 7 - 30 mg/dL    Creatinine 0.75 0.52 - 1.04 mg/dL    GFR Estimate >90 >60 mL/min/[1.73_m2]    GFR Estimate If Black >90 >60 mL/min/[1.73_m2]    Calcium 9.0 8.5 - 10.1 mg/dL    Bilirubin Total 0.4 0.2 - 1.3 mg/dL    Albumin 3.9 3.4 - 5.0 g/dL    Protein Total 7.6 6.8 - 8.8 g/dL    Alkaline Phosphatase 105 40 - 150 U/L    ALT 39 0 - 50 U/L    AST 14 0 - 45 U/L   Lipid panel reflex to direct LDL Fasting     Status: Abnormal   Result Value Ref Range    Cholesterol 156 <200 mg/dL    Triglycerides 172 (H) <150 mg/dL    HDL Cholesterol 38 (L) >49 mg/dL    LDL Cholesterol Calculated 84 <100 mg/dL    Non HDL Cholesterol 118 <130 mg/dL   TSH with free T4 reflex     Status: None   Result Value Ref Range    TSH 1.11 0.40 - 4.00 mU/L   Hepatitis  C antibody     Status: None   Result Value Ref Range    Hepatitis C Antibody Nonreactive NR^Nonreactive       ASSESSMENT/PLAN:   1. Routine general medical examination at a health care facility  Generally Lisa is healthy.  Discussed with her about age appropriate preventive care, including safety issue, healthy lifestyle modification, and substance/alcohol misuse prevention.  UTD for immunization and declined influenza and Pneumovax vaccination today.  Risk and benefit discussed and she is willing to take the risk .  Pap with HPV obtained.  No first-degree relatives with breast cancer.   Good sleeping hygiene discussed.  All of her questions were answered.  Lab today: as ordered below.     BC -  she has been on the Depo-Provera for several years.  Birth control option discussed and she is interested to try the Nexplanon.  Will come in for it before the next Depo due date.  Recommended vitamin D and calcium supplement due to prolonged use of the Provera.  If she continues with the Depo-Provera, will need to do DEXA scan.    - Pap imaged thin layer screen with HPV - recommended age 30 - 65  - HPV High Risk Types DNA Cervical  - Comprehensive metabolic panel  - Lipid panel reflex to direct LDL Fasting  - TSH with free T4 reflex  - Hepatitis C antibody    2. Tobacco use disorder  Lisa has been smoking for years.  Discussed with her about the long and short term consequences of tobacco smoking.  Discussed with her about different options for smoking cessation aids.  She is not ready to quit smoking at this time.  Encourage to let me know when she is ready to quit.  In a mean time, I encourage her to reduce to amount of cigarrets smoke as much as possible.  All of her questions were answered.     3. Hx of abnormal cervical Pap smear  Chart reviewed which indicated of ASCUS with HR HPV in 2006 and 2009.  Normal Pap smear with negative HPV since then.  Per patient's report, she had a LEEP done when she was 19.  No  "documentation available to review; perhaps colposcopy rather than LEEP.  The physical exam did not show obvious cervical scarring from LEEP.  Regardless, her Pap smear has been normal with negative average HPV for many years.  Both Pap smear and HPV obtained today and if both are normal/negative then resume routine cervical screening in 5 years.    4. Abnormal liver function tests  Resolved.   No excessive alcohol intake.      Patient has been advised of split billing requirements and indicates understanding: No  COUNSELING:  Reviewed preventive health counseling, as reflected in patient instructions       Regular exercise       Healthy diet/nutrition       Immunizations    Declined: Influenza and Pneumococcal due to Conscientious objector               Alcohol Use       Contraception       Family planning       Safe sex practices/STD prevention       One time pneumovax for smokers    Estimated body mass index is 21.77 kg/m  as calculated from the following:    Height as of 7/3/19: 1.626 m (5' 4\").    Weight as of 7/3/19: 57.5 kg (126 lb 12.8 oz).        She reports that she has been smoking cigarettes. She has a 10.50 pack-year smoking history. She has never used smokeless tobacco.  Tobacco Cessation Action Plan:   Information offered: Patient not interested at this time      Counseling Resources:  ATP IV Guidelines  Pooled Cohorts Equation Calculator  Breast Cancer Risk Calculator  BRCA-Related Cancer Risk Assessment: FHS-7 Tool  FRAX Risk Assessment  ICSI Preventive Guidelines  Dietary Guidelines for Americans, 2010  USDA's MyPlate  ASA Prophylaxis  Lung CA Screening    Maral Augustine Mai, MD  [facility]  "

## 2020-11-09 LAB — HCV AB SERPL QL IA: NONREACTIVE

## 2020-11-10 LAB
COPATH REPORT: NORMAL
PAP: NORMAL

## 2020-11-13 LAB
FINAL DIAGNOSIS: NORMAL
HPV HR 12 DNA CVX QL NAA+PROBE: NEGATIVE
HPV16 DNA SPEC QL NAA+PROBE: NEGATIVE
HPV18 DNA SPEC QL NAA+PROBE: NEGATIVE
SPECIMEN DESCRIPTION: NORMAL
SPECIMEN SOURCE CVX/VAG CYTO: NORMAL

## 2020-11-16 ENCOUNTER — PATIENT OUTREACH (OUTPATIENT)
Dept: FAMILY MEDICINE | Facility: CLINIC | Age: 40
End: 2020-11-16

## 2020-11-16 NOTE — TELEPHONE ENCOUNTER
5/10/01 pap LSIL  9/12/01 pap ASCUS favor dysplasia  10/11/01 colposcopy/ bx (CIN2)/ECC (negative)/ pap (ASCUS) recom: repeat pap q 3months x 1 year  2002 LEEP??  7/26/02 pap NIL, 12/27/02 pap NIL, 4/3/03 pap NIL, 7/24/07 pap NIL  9/8/08 pap ASCUS + high risk HPV   1/20/09 pap ASCUS + high risk HPV  9/3/09 pap NIL/ colposcopy- bx (negative)/ ECC (negative) repeat pap in one year   5/5/11 reminder call  6/17/11 reminder letter sent  8/19/11:Pap--NIL. Rpt in one year. In HM, ep, prob list, hx updated. Reminder sent.  7/17/12 reminder call  7/20/12 reminder letter sent  7/5/12 NIL per care everywhere  8/1/17 NIL/Neg HPV.  Plan: pap in 3 years  11/6/20 NIL/Neg HPV. Consider cotest in 1 year (updated guidelines recommend 3 consecutive cotest's with Hx SYDNIE 2)

## 2020-11-23 ENCOUNTER — OFFICE VISIT (OUTPATIENT)
Dept: FAMILY MEDICINE | Facility: CLINIC | Age: 40
End: 2020-11-23
Payer: COMMERCIAL

## 2020-11-23 VITALS
WEIGHT: 146 LBS | SYSTOLIC BLOOD PRESSURE: 112 MMHG | TEMPERATURE: 97.4 F | DIASTOLIC BLOOD PRESSURE: 70 MMHG | BODY MASS INDEX: 25.14 KG/M2 | HEART RATE: 82 BPM | OXYGEN SATURATION: 93 %

## 2020-11-23 DIAGNOSIS — Z30.017 ENCOUNTER FOR INITIAL PRESCRIPTION OF IMPLANTABLE SUBDERMAL CONTRACEPTIVE: Primary | ICD-10-CM

## 2020-11-23 LAB — HCG UR QL: NEGATIVE

## 2020-11-23 PROCEDURE — 11981 INSERTION DRUG DLVR IMPLANT: CPT | Performed by: FAMILY MEDICINE

## 2020-11-23 PROCEDURE — 81025 URINE PREGNANCY TEST: CPT | Performed by: FAMILY MEDICINE

## 2020-11-23 PROCEDURE — 99207 PR DROP WITH A PROCEDURE: CPT | Performed by: FAMILY MEDICINE

## 2020-11-23 ASSESSMENT — PAIN SCALES - GENERAL: PAINLEVEL: NO PAIN (0)

## 2020-11-23 NOTE — PROGRESS NOTES
Subjective     Lisa Combs is a 40 year old female who presents to clinic today for the following health issues:    HPI         Chief Complaint   Patient presents with     Contraception     Nexplanon insertion       Lisa is here for contraception management and is desirous for Nexplanon. She was seen several weeks ago for physical and BC options were discussed.  She has been doing well with the Depo provera for years, but is concerned of its weight gain side effect. She is interested to try the Nexplanon and would like to have its placement today. She is one week out of the window period - pregnancy test was negative today.  She smokes about 1/2 ppd; no personal or family history of blood clot. Denied any risk for STD. No other concern today.    Review of Systems   Constitutional, HEENT, cardiovascular, pulmonary, gi and gu systems are negative, except as otherwise noted.      Objective    /70   Pulse 82   Temp 97.4  F (36.3  C) (Temporal)   Wt 66.2 kg (146 lb)   SpO2 93%   BMI 25.14 kg/m    Body mass index is 25.14 kg/m .  Physical Exam   GENERAL: healthy, alert and no distress    Results for orders placed or performed in visit on 11/23/20   HCG qualitative urine     Status: None   Result Value Ref Range    HCG Qual Urine Negative NEG^Negative           Assessment & Plan     Encounter for initial prescription of implantable subdermal contraceptive    I discussed with her about differnt birth control options. Pros and cons of each options was also discussed. She feels that is Nexplanon is more appropriate for her. She is not plannning to have anymore children soon.  Been tolerating the Depo-Provera injection well.  Discussed about the Nexplanon and its insertion procedure. She denies any risk for STD and informed her that the Nexplanon only protects her from pregnancy, not STD. Nexplanon was placed in today and please see my procedure note for further details.  She was also instructed to have it  removed in 3-year, by 11/22/2023    - HCG qualitative urine  - etonogestrel (NEXPLANON) subdermal implant 68 mg  - SC SUBDERMAL IMPLANT PLACEMENT       Return in about 1 year (around 11/23/2021) for Physical Exam.    Maral Augustine Mai, MD  [facility]

## 2020-11-29 PROBLEM — Z30.017 ENCOUNTER FOR INITIAL PRESCRIPTION OF IMPLANTABLE SUBDERMAL CONTRACEPTIVE: Status: ACTIVE | Noted: 2020-11-29

## 2020-11-29 NOTE — PROCEDURES
Procedure: Nexplan placement    Indication: Lisa is desirous of Nexplanon for her birth control.   Different birth control options with their pros and cons were discussed again today.  She is desirous for Nexplanon placement today.     Her pregnancy test is negative today.    The whole procedure was discussed with Lisa in details. Risks associated with the procedure were also discussed, which includes but not limited to pain, bleeding, and infection. Reassured patient in rare cases, the Nexplanon could be inserted too deep which may requires further evaluation and intervention. In rare cases, migration of the Nexplanon could happen but the chance is very rare. Patient is also reassured that although the Nexplanon is very effective, it is not 100% therefore there is a small chance that she may get pregnant while she is on it. She was reminded that it only protects her from pregnancy not STD. The patient denies having any risk of any kind of STD. The patient is okay to proceed with the procedure and a consent was obtained.    Timeout  was performed. She was identified by name and chart number. The name of the procedure and the location to be performed  identified.  All necessary equipment in the room.    DESCRIPTION OF THE PROCEDURE: The whole procedure was done in the usual sterlie manner. She was placed in the suprine position with her left arm extended to 90 degrees with the left elbow flexed to 90 degrees. She is right handed. The medial aspect of the left upper arm, a paulina was made about 8-10 cm above the medial epicondyle and a guiding paulina 4 cm above the first. The insertion point was locally anesthetized with lidocine with epi, about 3 cc was used. She has good anesthesia from that. The Nexplanon was then checked and it intact. The iodine solution was used to clean the procedure site. Routine Nexplanon insertion performed in the usual sterile manner; the skin at the insertion site was stretched the thumb  and index finger, the needle inserted at about 30 degrees and then lower to horizontal position.  The needle was then advanced slowly and subcutaneously to its full length without any complications or problems. The tip of needle was monitored closely and it remained to be subcontaneously. The needle was removed in the usual manner. Correct placement was confirmed by palpation in her arm and visualizing the purple top of the obturator.  The maria was palpated subcutaneously by myself and the patient. The area was then cleaned with normal saline and wrapped with a curlex. Recommend the patient keep the area dry and keep the curlex intact for the next 24 hours. After then, a bandaid should be applied to the insertion site until it is healed which could take upto 3 to 4 days. Routine post procedure care initiated as well. Encouraged her to take Tylenol and Motrin for pain and to monitor for signs of infection. She was also educated about symptoms to be seen so call in.    The patient was again reminded that Nexplanon only protects her from pregnancy but not STD. Also strongly encouraged to use backup method in the next week.  Also inform her that the Nexplanon needs to be removed in three years.  She feels comfortable with the plan and all of her questions were answered.    Maral Ybarra MD  .

## 2021-01-09 ENCOUNTER — HEALTH MAINTENANCE LETTER (OUTPATIENT)
Age: 41
End: 2021-01-09

## 2021-10-23 ENCOUNTER — HEALTH MAINTENANCE LETTER (OUTPATIENT)
Age: 41
End: 2021-10-23

## 2021-10-25 ENCOUNTER — PATIENT OUTREACH (OUTPATIENT)
Dept: FAMILY MEDICINE | Facility: CLINIC | Age: 41
End: 2021-10-25
Payer: COMMERCIAL

## 2021-10-25 DIAGNOSIS — Z87.42 HX OF ABNORMAL CERVICAL PAP SMEAR: ICD-10-CM

## 2021-10-25 NOTE — LETTER
December 20, 2021      Lisa Combs  [address] [facility] [id number]        Dear ,    We are writing to inform you that you are due for your pap and HPV screening. Please call us to schedule an appointment.    If you have any questions or concerns, please call the clinic at the number listed above.       Sincerely,    LAVERN Johnson

## 2021-12-17 NOTE — TELEPHONE ENCOUNTER
Please send letter to patient to have her to follow-up for cervical cancer screening due to history of abnormal Pap smear.  Thank you

## 2021-12-17 NOTE — TELEPHONE ENCOUNTER
FYI to provider - Patient is lost to pap tracking follow-up. Attempts to contact pt have been made per reminder process and there has been no reply and/or no appt scheduled.       11/6/20 NIL/Neg HPV. Consider cotest in 1 year (updated guidelines recommend 3 years of back-to-back cotesting with Hx SYDNIE 2, then cotesting every 3 years for 25 years following LEEP)  10/25/21 Reminder mychart  11/22/21 Reminder call-LM  12/17/21 Lost to follow-up for pap tracking.

## 2021-12-18 ENCOUNTER — HEALTH MAINTENANCE LETTER (OUTPATIENT)
Age: 41
End: 2021-12-18

## 2021-12-20 NOTE — TELEPHONE ENCOUNTER
I have written and printed a letter to the pt with the information below. Sabra Dunbar CMA ([facility])

## 2022-06-14 ENCOUNTER — MYC MEDICAL ADVICE (OUTPATIENT)
Dept: FAMILY MEDICINE | Facility: CLINIC | Age: 42
End: 2022-06-14
Payer: COMMERCIAL

## 2022-06-22 ENCOUNTER — OFFICE VISIT (OUTPATIENT)
Dept: FAMILY MEDICINE | Facility: CLINIC | Age: 42
End: 2022-06-22
Payer: COMMERCIAL

## 2022-06-22 ENCOUNTER — MYC REFILL (OUTPATIENT)
Dept: FAMILY MEDICINE | Facility: CLINIC | Age: 42
End: 2022-06-22

## 2022-06-22 VITALS
RESPIRATION RATE: 16 BRPM | BODY MASS INDEX: 24 KG/M2 | OXYGEN SATURATION: 98 % | DIASTOLIC BLOOD PRESSURE: 70 MMHG | WEIGHT: 139.4 LBS | HEART RATE: 107 BPM | SYSTOLIC BLOOD PRESSURE: 110 MMHG | TEMPERATURE: 98.1 F

## 2022-06-22 DIAGNOSIS — Z97.5 BREAKTHROUGH BLEEDING ON NEXPLANON: ICD-10-CM

## 2022-06-22 DIAGNOSIS — Z30.8 ENCOUNTER FOR OTHER CONTRACEPTIVE MANAGEMENT: ICD-10-CM

## 2022-06-22 DIAGNOSIS — N92.1 BREAKTHROUGH BLEEDING ON NEXPLANON: ICD-10-CM

## 2022-06-22 DIAGNOSIS — Z30.011 ENCOUNTER FOR INITIAL PRESCRIPTION OF CONTRACEPTIVE PILLS: Primary | ICD-10-CM

## 2022-06-22 PROCEDURE — 99213 OFFICE O/P EST LOW 20 MIN: CPT | Mod: 25 | Performed by: FAMILY MEDICINE

## 2022-06-22 PROCEDURE — 11982 REMOVE DRUG IMPLANT DEVICE: CPT | Performed by: FAMILY MEDICINE

## 2022-06-22 RX ORDER — NORGESTIMATE AND ETHINYL ESTRADIOL 7DAYSX3 28
1 KIT ORAL DAILY
Qty: 90 TABLET | Refills: 0 | Status: CANCELLED | OUTPATIENT
Start: 2022-06-22

## 2022-06-22 RX ORDER — DESOGESTREL AND ETHINYL ESTRADIOL 0.15-0.03
KIT ORAL
Qty: 84 TABLET | Refills: 3 | Status: SHIPPED | OUTPATIENT
Start: 2022-06-22 | End: 2023-02-14

## 2022-06-22 ASSESSMENT — PAIN SCALES - GENERAL: PAINLEVEL: NO PAIN (0)

## 2022-06-22 NOTE — PROGRESS NOTES
Assessment & Plan     Encounter for initial prescription of contraceptive pills    - desogestrel-ethinyl estradiol (APRI) 0.15-30 MG-MCG tablet; Patient will take 21 days of active medicine and will skip the 7 days of placebo and start a new pack she will only take the placebo twice a year      Nexplanon removal: Patient will watch for any sign of infection.      Jad Sutton MD  [facility] LANA Plaza is a 42 year old, presenting for the following health issues:  Contraception      Contraception    History of Present Illness       Reason for visit:  Removal of nexplen    She eats 0-1 servings of fruits and vegetables daily.She consumes 3 sweetened beverage(s) daily.She exercises with enough effort to increase her heart rate 20 to 29 minutes per day.  She exercises with enough effort to increase her heart rate 7 days per week.   She is taking medications regularly.     Is here to have the Nexplanon removed.            Review of Systems   Constitutional, HEENT, cardiovascular, pulmonary, gi and gu systems are negative, except as otherwise noted.      Objective    /70   Pulse 107   Temp 98.1  F (36.7  C)   Resp 16   Wt 63.2 kg (139 lb 6.4 oz)   SpO2 98%   BMI 24.00 kg/m    Body mass index is 24 kg/m .  Physical Exam   GENERAL: healthy, alert and no distress      Procedure:  The patient's skin over  The Nexplanon implant was prepped with alcohol anesthetized with 1% Xylocaine with epinephrine.  A small skin incision was made through the old scar with 11 blade scalpel.  The implant was easily removed through the incision the incision was closed with skin glue.  Sterile dressing was applied.                    .  ..

## 2022-10-09 ENCOUNTER — HEALTH MAINTENANCE LETTER (OUTPATIENT)
Age: 42
End: 2022-10-09

## 2022-10-28 ENCOUNTER — OFFICE VISIT (OUTPATIENT)
Dept: FAMILY MEDICINE | Facility: CLINIC | Age: 42
End: 2022-10-28
Payer: COMMERCIAL

## 2022-10-28 VITALS
BODY MASS INDEX: 24.75 KG/M2 | HEART RATE: 83 BPM | DIASTOLIC BLOOD PRESSURE: 66 MMHG | TEMPERATURE: 97.8 F | WEIGHT: 145 LBS | SYSTOLIC BLOOD PRESSURE: 106 MMHG | RESPIRATION RATE: 16 BRPM | HEIGHT: 64 IN

## 2022-10-28 DIAGNOSIS — K21.9 GASTRO-ESOPHAGEAL REFLUX DISEASE WITHOUT ESOPHAGITIS: ICD-10-CM

## 2022-10-28 DIAGNOSIS — M25.512 LEFT SHOULDER PAIN, UNSPECIFIED CHRONICITY: ICD-10-CM

## 2022-10-28 DIAGNOSIS — Z00.00 ROUTINE GENERAL MEDICAL EXAMINATION AT A HEALTH CARE FACILITY: Primary | ICD-10-CM

## 2022-10-28 DIAGNOSIS — Z87.42 HX OF ABNORMAL CERVICAL PAP SMEAR: ICD-10-CM

## 2022-10-28 DIAGNOSIS — F17.200 TOBACCO USE DISORDER: ICD-10-CM

## 2022-10-28 PROBLEM — Z30.017 ENCOUNTER FOR INITIAL PRESCRIPTION OF IMPLANTABLE SUBDERMAL CONTRACEPTIVE: Status: RESOLVED | Noted: 2020-11-29 | Resolved: 2022-10-28

## 2022-10-28 PROCEDURE — 99396 PREV VISIT EST AGE 40-64: CPT | Mod: 25 | Performed by: FAMILY MEDICINE

## 2022-10-28 PROCEDURE — 87624 HPV HI-RISK TYP POOLED RSLT: CPT | Performed by: FAMILY MEDICINE

## 2022-10-28 PROCEDURE — 90471 IMMUNIZATION ADMIN: CPT | Performed by: FAMILY MEDICINE

## 2022-10-28 PROCEDURE — G0145 SCR C/V CYTO,THINLAYER,RESCR: HCPCS | Performed by: FAMILY MEDICINE

## 2022-10-28 PROCEDURE — 99213 OFFICE O/P EST LOW 20 MIN: CPT | Mod: 25 | Performed by: FAMILY MEDICINE

## 2022-10-28 PROCEDURE — 90677 PCV20 VACCINE IM: CPT | Performed by: FAMILY MEDICINE

## 2022-10-28 RX ORDER — NAPROXEN 500 MG/1
500 TABLET ORAL 2 TIMES DAILY PRN
Qty: 60 TABLET | Refills: 1 | Status: SHIPPED | OUTPATIENT
Start: 2022-10-28 | End: 2022-12-20

## 2022-10-28 ASSESSMENT — ENCOUNTER SYMPTOMS
SHORTNESS OF BREATH: 0
NERVOUS/ANXIOUS: 0
CHILLS: 0
DIARRHEA: 0
PARESTHESIAS: 0
SORE THROAT: 0
FEVER: 0
JOINT SWELLING: 0
ABDOMINAL PAIN: 0
FREQUENCY: 0
BREAST MASS: 0
DYSURIA: 0
WEAKNESS: 0
HEMATURIA: 0
NAUSEA: 0
PALPITATIONS: 0
CONSTIPATION: 0
EYE PAIN: 0
HEARTBURN: 0
HEMATOCHEZIA: 0
COUGH: 0
ARTHRALGIAS: 0
DIZZINESS: 0
MYALGIAS: 0
HEADACHES: 1

## 2022-10-28 ASSESSMENT — PAIN SCALES - GENERAL: PAINLEVEL: NO PAIN (0)

## 2022-10-28 NOTE — NURSING NOTE
Prior to immunization administration, verified patients identity using patient s name and date of birth. Please see Immunization Activity for additional information.     Screening Questionnaire for Adult Immunization    Are you sick today?   No   Do you have allergies to medications, food, a vaccine component or latex?   No   Have you ever had a serious reaction after receiving a vaccination?   No   Do you have a long-term health problem with heart, lung, kidney, or metabolic disease (e.g., diabetes), asthma, a blood disorder, no spleen, complement component deficiency, a cochlear implant, or a spinal fluid leak?  Are you on long-term aspirin therapy?   No   Do you have cancer, leukemia, HIV/AIDS, or any other immune system problem?   No   Do you have a parent, brother, or sister with an immune system problem?   No   In the past 3 months, have you taken medications that affect  your immune system, such as prednisone, other steroids, or anticancer drugs; drugs for the treatment of rheumatoid arthritis, Crohn s disease, or psoriasis; or have you had radiation treatments?   No   Have you had a seizure, or a brain or other nervous system problem?   No   During the past year, have you received a transfusion of blood or blood    products, or been given immune (gamma) globulin or antiviral drug?   No   For women: Are you pregnant or is there a chance you could become       pregnant during the next month?   No   Have you received any vaccinations in the past 4 weeks?   No     Immunization questionnaire answers were all negative.        Per orders of Dr. Ybarra, injection of Pneumococcal 20 given by Deborah Roa CMA. Patient instructed to remain in clinic for 15 minutes afterwards, and to report any adverse reaction to me immediately.       Screening performed by Deborah Roa CMA on 10/28/2022 at 10:25 AM.

## 2022-10-28 NOTE — PROGRESS NOTES
Assessment & Plan     Routine general medical examination at a health care facility  Mela is generally healthy and is doing well.  It has been 2 years since last physical.  Annual Pap with HPV done today.  No first-degree relatives with breast cancer.  UTD for flu shot.  Declined COVID booster but receive Prevnar 20 vaccination today.  Discussed age-appropriate care including healthy lifestyle modifications, alcohol and tobacco use, safety, and STD prevention.  Patient currently uses combination estrogen progesterone for contraception and is happy with this method.  Denied of STD risks.  Lipid panel was normal at last visit, will recheck next year.    - Pap imaged thin layer screen with HPV - recommended age 30 - 65  - HPV High Risk Tyles DNA Cervical      Left shoulder pain, unspecified chronicity  Intermittent left shoulder pain when lifting objects overhead at work for past 3 months.  No trauma to the joint. No pain today.  Normal ROM and exam was normal.  Most likely due to overuse tendinitis.  Started Naproxen as needed.  Normal activities as tolerated, but avoid activities that would provoke the pain.  Recommend physical therapy if become a chronic or frequent recurrent problem.    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  Additional history of ASCUS with HR HPV in 2006 and 2009.  Pap smears have been normal since then.  Pap smear with HPV cotesting today.  If normal continue routine cervical screening every 5 years.    - Pap imaged thin layer screen with HPV - recommended age 30 - 65  - HPV High Risk Tyles DNA Cervical      Tobacco use disorder  Been smoking for 21 years, on average about 1 pack/day.  Discussed about potential long and short-term complicatiions of tobacco smoking, and she understands these risks.  Encouraged to stop smoking and discussed about option for smoking cessation aids,  including nicotine replacement and Chantix.  She is not ready to quit smoking at this time.  Recommended reducing number of cigarettes per day.    Her acid reflux has resolved, no concern.  Not on medication.  Diet modification discussed and recommended.  No excessive NSAID or alcohol intake.  Follow-up as needed.      Nicotine/Tobacco Cessation:  She reports that she has been smoking cigarettes. She has a 21.00 pack-year smoking history. She has never used smokeless tobacco.  Nicotine/Tobacco Cessation Plan:   Information offered: Patient not interested at this time      FUTURE APPOINTMENTS:       - Follow-up visit in one year    Patient was seen and examined by myself and Dr. Ybarra.  The note was then scribed by me.     Albertina Stevens, MS3  [employer]    This patient was seen and examined by myself as well as the medical student.  The medical student scribed the note and I have reviewed it, edited it appropriately, and agree with the final documentation.    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 Pertinent negatives include no abdominal pain, arthralgias, chest pain, chills, congestion, coughing, fever, joint swelling, myalgias, nausea, rash, sore throat or weakness.   Healthy Habits:     Getting at least 3 servings of Calcium per day:  Yes    Bi-annual eye exam:  Yes    Dental care twice a year:  Yes    Sleep apnea or symptoms of sleep apnea:  None    Diet:  Regular (no restrictions)    Frequency of exercise:  None    Taking medications regularly:  Yes    Medication side effects:  Not applicable    PHQ-2 Total Score: 0    Additional concerns today:  No     Treatment is a 42-year-old woman here today for her annual preventative visit and the shoulder pain.  Her last physical was 2 years  ago and was normal.  No major changes to her medical record since last time.  Medications include oral contraceptive pills and over-the-counter ibuprofen for aches and pains.  Patient has medical history LGSIL and a LEEP procedure in 2002, followed by ASCUS in 2006 and 2009.  Pap smears have been normal since.  Last Pap was in 2020 and was normal.      Been having theleft shoulder pain in the last couple months.  Comes and goes with no know triggers.  She works for hospital, doing a lot of overhead lifting.  No specific movements make pain worse, but is worse after a long day of work. Pain usually goes away with resting.  Ibuprofen has helped.  No pain to the touch or when she lays on it.  Rarely it radiates down her arm.  No history of trauma and no unusual activities.  No weakness or numbness/tingling of arm.     Generally she is doing well - no headache or dizziness.  No fever, chills, shortness of breath, chest pain, nausea, vomiting, diarrhea, or constipation.  She has had clear rhinorrhea for around a month without congestion or cough, likely related to weather.  No problems with sleeping or mood.  Drinks alcohol socially on weekends and smokes approximately 1 pack/day - not ready to quit.  Feels safe at home.  No weight change.  No other concerns today.        Review of Systems   Constitutional: Negative for chills and fever.   HENT: Negative for congestion, ear pain, hearing loss and sore throat.    Eyes: Negative for pain and visual disturbance.   Respiratory: Negative for cough and shortness of breath.    Cardiovascular: Negative for chest pain, palpitations and peripheral edema.   Gastrointestinal: Negative for abdominal pain, constipation, diarrhea, heartburn, hematochezia and nausea.   Breasts:  Negative for tenderness, breast mass and discharge.   Genitourinary: Negative for dysuria, frequency, genital sores, hematuria, pelvic pain, urgency, vaginal bleeding and vaginal discharge.   Musculoskeletal:  "Negative for arthralgias, joint swelling and myalgias.   Skin: Negative for rash.   Neurological: Positive for headaches. Negative for dizziness, weakness and paresthesias.   Psychiatric/Behavioral: Negative for mood changes. The patient is not nervous/anxious.       Constitutional, HEENT, cardiovascular, pulmonary, gi and gu systems are negative, except as otherwise noted.      Objective    /66 (BP Location: Left arm, Patient Position: Sitting, Cuff Size: Adult Regular)   Pulse 83   Temp 97.8  F (36.6  C) (Temporal)   Resp 16   Ht 1.633 m (5' 4.3\")   Wt 65.8 kg (145 lb)   LMP  (LMP Unknown)   BMI 24.66 kg/m    Body mass index is 24.66 kg/m .     Physical Exam   GENERAL: healthy, alert and no distress  EYES: Eyes grossly normal to inspection, PERRL and conjunctivae and sclerae normal  HENT: ear canals and TM's normal, nose and mouth without ulcers or lesions  NECK: no adenopathy, no asymmetry, masses, or scars and thyroid normal to palpation  RESP: lungs clear to auscultation - no rales, rhonchi or wheezes  BREAST: patient declined  CV: regular rate and rhythm, normal S1 S2, no S3 or S4, no murmur, click or rub, no peripheral edema and peripheral pulses strong  ABDOMEN: soft, nontender, no hepatosplenomegaly, no masses and bowel sounds normal   (female): normal female external genitalia, normal urethral meatus, vaginal mucosa pink, moist, well rugated, and normal cervix without masses or discharge. Some cervical bleeding following pap smear.  MS: no gross musculoskeletal defects noted, no edema. Negative neer, empty can, and lift off tests. No tenderness to palpation of shoulder joints. Full ROM intact.  SKIN: no suspicious lesions or rashes. Small lesion on back of neck- patient states she picked at a pimple.  NEURO: Normal strength and tone, mentation intact and speech normal  PSYCH: mentation appears normal, affect normal/bright    No results found for this or any previous visit (from the past 24 " hour(s)).

## 2022-10-30 NOTE — PROGRESS NOTES
SUBJECTIVE:   CC: Mela is an 42 year old who presents for preventive health visit.     \  Patient has been advised of split billing requirements and indicates understanding: Yes  HPI      Shoulder Pain (Left shoulder pain) and Physical        Shoulder Pain  Associated symptoms include headaches. Pertinent negatives include no abdominal pain, arthralgias, chest pain, chills, congestion, coughing, fever, joint swelling, myalgias, nausea, rash, sore throat or weakness.   Healthy Habits:     Getting at least 3 servings of Calcium per day:  Yes    Bi-annual eye exam:  Yes    Dental care twice a year:  Yes    Sleep apnea or symptoms of sleep apnea:  None    Diet:  Regular (no restrictions)    Frequency of exercise:  None    Taking medications regularly:  Yes    Medication side effects:  Not applicable    PHQ-2 Total Score: 0    Additional concerns today:  No     Today's PHQ-2 Score:   PHQ-2 ( 1999 Pfizer) 10/28/2022   Q1: Little interest or pleasure in doing things 0   Q2: Feeling down, depressed or hopeless 0   PHQ-2 Score 0   PHQ-2 Total Score (12-17 Years)- Positive if 3 or more points; Administer PHQ-A if positive -   Q1: Little interest or pleasure in doing things Not at all   Q2: Feeling down, depressed or hopeless Not at all   PHQ-2 Score 0       Lisa is a 42-year-old woman here today for her annual preventative visit and the shoulder pain.  Her last physical was 2 years ago and was normal.  No major changes to her medical record since last time.  Medications include oral contraceptive pills and over-the-counter ibuprofen for aches and pains.  Patient has medical history LGSIL and a LEEP procedure in 2002, followed by ASCUS in 2006 and 2009.  Pap smears have been normal since.  Last Pap was in 2020 and was normal.       Been having the left shoulder pain in the last couple months.  Comes and goes with no know triggers.  She works for hospital, doing a lot of overhead lifting.  No specific movements make pain  worse, but is worse after a long day of work. Pain usually goes away with resting.  Ibuprofen has helped.  No pain to the touch or when she lays on it.  Rarely it radiates down her arm.  No history of trauma and no unusual activities.  No weakness or numbness/tingling of arm.      Generally she is doing well - no headache or dizziness.  No fever, chills, shortness of breath, chest pain, nausea, vomiting, diarrhea, or constipation.  She has had clear rhinorrhea for around a month without congestion or cough, likely related to weather.  No problems with sleeping or mood.  Drinks alcohol socially on weekends and smokes approximately 1 pack/day - not ready to quit.  Feels safe at home.  No weight change.  No other concerns today.    Abuse: Current or Past (Physical, Sexual or Emotional) - No  Do you feel safe in your environment? Yes    Have you ever done Advance Care Planning? (For example, a Health Directive, POLST, or a discussion with a medical provider or your loved ones about your wishes): No, advance care planning information given to patient to review.  Advanced care planning was discussed at today's visit.    Social History     Tobacco Use     Smoking status: Every Day     Packs/day: 1.00     Years: 21.00     Pack years: 21.00     Types: Cigarettes     Smokeless tobacco: Never     Tobacco comments:     started age 16   Substance Use Topics     Alcohol use: Yes     Comment: socially     If you drink alcohol do you typically have >3 drinks per day or >7 drinks per week? No    Alcohol Use 10/28/2022   Prescreen: >3 drinks/day or >7 drinks/week? No   Prescreen: >3 drinks/day or >7 drinks/week? -       Reviewed orders with patient.  Reviewed health maintenance and updated orders accordingly - Yes  Patient Active Problem List   Diagnosis     Hx of abnormal cervical Pap smear     Tobacco use disorder     Past Surgical History:   Procedure Laterality Date     CHOLECYSTECTOMY       HC COLP CERVIX/UPPER VAGINA W LOOP  ELEC BX CERVIX  2001     LEEP TX, CERVICAL  2002     TUBAL LIGATION         Social History     Tobacco Use     Smoking status: Every Day     Packs/day: 1.00     Years: 21.00     Pack years: 21.00     Types: Cigarettes     Smokeless tobacco: Never     Tobacco comments:     started age 16   Substance Use Topics     Alcohol use: Yes     Comment: socially     Family History   Problem Relation Age of Onset     Hypertension Mother      Hypertension Father      Gout Father      Cancer Father         myofribosis     No Known Problems Brother      No Known Problems Sister      Hypertension Maternal Grandmother      Neurologic Disorder Maternal Grandfather         ALS     Unknown/Adopted Paternal Grandmother      Cancer Paternal Grandfather         unsure     No Known Problems Son      No Known Problems Daughter      No Known Problems Son      Family History Negative No family hx of          Current Outpatient Medications   Medication Sig Dispense Refill     desogestrel-ethinyl estradiol (APRI) 0.15-30 MG-MCG tablet Patient will take 21 days of active medicine and will skip the 7 days of placebo and start a new pack she will only take the placebo twice a year 84 tablet 3     naproxen (NAPROSYN) 500 MG tablet Take 1 tablet (500 mg) by mouth 2 times daily as needed for moderate pain 60 tablet 1     Allergies   Allergen Reactions     No Known Drug Allergies        Breast Cancer Screening:    FHS-7: No flowsheet data found.  No family history of breast cancer    Mammogram Screening - Offered annual screening and updated Health Maintenance for mutual plan based on risk factor consideration    Pertinent mammograms are reviewed under the imaging tab.    History of abnormal Pap smear: YES - other categories - see link Cervical Cytology Screening Guidelines  -  Has been normal for years - back to routine recommendation    PAP / HPV Latest Ref Rng & Units 11/6/2020 8/1/2017 8/19/2011   PAP (Historical) - NIL NIL NIL   HPV16  "NEG:Negative Negative Negative -   HPV18 NEG:Negative Negative Negative -   HRHPV NEG:Negative Negative Negative -     Reviewed and updated as needed this visit by clinical staff   Tobacco  Allergies  Meds  Problems  Med Hx  Surg Hx  Fam Hx  Soc   Hx        Reviewed and updated as needed this visit by Provider   Tobacco  Allergies  Meds  Problems  Med Hx  Surg Hx  Fam Hx  Soc   Hx           Review of Systems    Please see subjective otherwise the complete review of system was negative      OBJECTIVE:   /66 (BP Location: Left arm, Patient Position: Sitting, Cuff Size: Adult Regular)   Pulse 83   Temp 97.8  F (36.6  C) (Temporal)   Resp 16   Ht 1.633 m (5' 4.3\")   Wt 65.8 kg (145 lb)   LMP  (LMP Unknown)   BMI 24.66 kg/m    Physical Exam   GENERAL: healthy, alert and no distress.  Speaking in full sentences.  EYES: Eyes grossly normal to inspection, PERRL and conjunctivae and sclerae normal.  No nystagmus.  All 4 visual fields intact.  HENT: ear canals and TM's normal.  Nares are non-congested. Oropharynx is pink and moist. No tender with palpation to the sinuses.   NECK: no adenopathy, supple, no lymphadenopathy or thyromegaly.  No tender with palpation to the cervical spine or its paraspinous muscle.  RESP: lungs clear to auscultation - no rales, rhonchi or wheezes.  Good respiratory effort throughout.  BREAST: Offered but declined, she has no concern about it.   CV: regular rate and rhythm, no murmur  ABDOMEN: soft, nontender, nondistended, no palpable masses organomegaly with normal culture.  No CVA tenderness.   (female): Offered but declined, she has no concern about it  MS: no gross musculoskeletal defects noted, no edema. All joints are in the normal range of motion and 4 extremities were equally in strength. Hips, knees, ankles, shoulders, elbows, wrists exams were normal. Fine motor skills of the fingers are intact. Back is straight, no tender with palpation to the spine.  SKIN: " no suspicious lesions or rashes  NEURO: Normal strength and tone, mentation intact and speech normal.  Cranial nerve II through XII intact.  DTRs +2 throughout.  No focal neurological deficit.  PSYCH: mentation appears normal, affect normal/bright.  Thoughts intact, suicidal/homicidal ideation.  No hallucination.      Diagnostic Test Results:  Labs reviewed in Epic  No results found for any visits on 10/28/22.    ASSESSMENT/PLAN:   (Z00.00) Routine general medical examination at a health care facility  (primary encounter diagnosis)  Comment: Mela is generally healthy and is doing well.  It has been 2 years since last physical.  Annual Pap with HPV done today.  No first-degree relatives with breast cancer.  UTD for flu shot.  Declined COVID booster but receive Prevnar 20 vaccination today.  Discussed age-appropriate care including healthy lifestyle modifications, alcohol and tobacco use, safety, and STD prevention.  Patient currently uses combination estrogen progesterone for contraception and is happy with this method.  Denied of STD risks.  Lipid panel was normal at last visit, will recheck next year.     Plan: PAP screen with HPV - recommended age 30 - 65         years            (M25.512) Left shoulder pain, unspecified chronicity  Comment: Intermittent left shoulder pain when lifting objects overhead at work for past 2-3 months.  No trauma to the joint. No pain today.  Normal ROM and exam was normal.  Most likely due to overuse tendinitis.  Started Naproxen as needed.  Normal activities as tolerated, but avoid activities that would provoke the pain.  Recommend physical therapy if become a chronic or frequent recurrent problem.     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  Additional history of ASCUS with HR HPV in 2006 and 2009.  Pap smears have been normal  "since then.  Pap smear with HPV cotesting today.  If normal continue routine cervical screening every 5 years.       (F17.200) Tobacco use disorder  Comment: Been smoking for 21 years, on average about 1 pack/day.  Discussed about potential long and short-term complicatiions of tobacco smoking, and she understands these risks.  Encouraged to stop smoking and discussed about option for smoking cessation aids, including nicotine replacement and Chantix.  She is not ready to quit smoking at this time.  Recommended reducing number of cigarettes per day.       (K21.9) Gastro-esophageal reflux disease without esophagitis  Comment: Her acid reflux has resolved, no concern.  Not on medication.  Diet modification discussed and recommended.  No excessive NSAID or alcohol intake.  Follow-up as needed.      Patient has been advised of split billing requirements and indicates understanding: No      COUNSELING:  Reviewed preventive health counseling, as reflected in patient instructions       Regular exercise       Healthy diet/nutrition       Immunizations    Vaccinated for: Pneumococcal and Declined: covid booster due to Conscientious objector               Alcohol Use       Contraception       Osteoporosis prevention/bone health       Safe sex practices/STD prevention       Colorectal Cancer Screening       Consider Hep C screening for all patients one time for ages 18-79 years       One time pneumovax for smokers       Advance Care Planning    Estimated body mass index is 24.66 kg/m  as calculated from the following:    Height as of this encounter: 1.633 m (5' 4.3\").    Weight as of this encounter: 65.8 kg (145 lb).        She reports that she has been smoking cigarettes. She has a 21.00 pack-year smoking history. She has never used smokeless tobacco.  Nicotine/Tobacco Cessation Plan:   Information offered: Patient not interested at this time      Counseling Resources:  ATP IV Guidelines  Pooled Cohorts Equation " Calculator  Breast Cancer Risk Calculator  BRCA-Related Cancer Risk Assessment: FHS-7 Tool  FRAX Risk Assessment  ICSI Preventive Guidelines  Dietary Guidelines for Americans, 2010  USDA's MyPlate  ASA Prophylaxis  Lung CA Screening    Maral Augustine Mai, MD  [facility]

## 2022-11-01 LAB
BKR LAB AP GYN ADEQUACY: NORMAL
BKR LAB AP GYN INTERPRETATION: NORMAL
BKR LAB AP HPV REFLEX: NORMAL
BKR LAB AP PREVIOUS ABNORMAL: NORMAL
PATH REPORT.COMMENTS IMP SPEC: NORMAL
PATH REPORT.COMMENTS IMP SPEC: NORMAL
PATH REPORT.RELEVANT HX SPEC: NORMAL

## 2022-11-02 LAB
HUMAN PAPILLOMA VIRUS 16 DNA: POSITIVE
HUMAN PAPILLOMA VIRUS 18 DNA: NEGATIVE
HUMAN PAPILLOMA VIRUS FINAL DIAGNOSIS: ABNORMAL
HUMAN PAPILLOMA VIRUS OTHER HR: NEGATIVE

## 2022-11-03 ENCOUNTER — TELEPHONE (OUTPATIENT)
Dept: FAMILY MEDICINE | Facility: CLINIC | Age: 42
End: 2022-11-03

## 2022-11-03 ENCOUNTER — PATIENT OUTREACH (OUTPATIENT)
Dept: FAMILY MEDICINE | Facility: CLINIC | Age: 42
End: 2022-11-03

## 2022-11-03 NOTE — TELEPHONE ENCOUNTER
Reason for call:  Other   Patient called regarding (reason for call): appointment  Additional comments: Pt needs to schedule a Colposcopy. Prefers with Maral Ybarra MD. Please contact pt.     Phone number to reach patient:  Cell number on file:    Telephone Information:   Mobile [phone]       Best Time:  Anytime    Can we leave a detailed message on this number?  YES    Travel screening: Not Applicable

## 2022-11-07 NOTE — TELEPHONE ENCOUNTER
Call placed to patient and first available offered on 12/14, with a 12:40 pm arrival. Patient stating that would work. Appointment has been scheduled. Becca Boggs LPN

## 2022-12-14 ENCOUNTER — OFFICE VISIT (OUTPATIENT)
Dept: FAMILY MEDICINE | Facility: CLINIC | Age: 42
End: 2022-12-14
Payer: COMMERCIAL

## 2022-12-14 VITALS
WEIGHT: 145.25 LBS | RESPIRATION RATE: 20 BRPM | HEIGHT: 65 IN | OXYGEN SATURATION: 99 % | BODY MASS INDEX: 24.2 KG/M2 | HEART RATE: 76 BPM | TEMPERATURE: 97.6 F | DIASTOLIC BLOOD PRESSURE: 80 MMHG | SYSTOLIC BLOOD PRESSURE: 122 MMHG

## 2022-12-14 DIAGNOSIS — Z87.42 HX OF ABNORMAL CERVICAL PAP SMEAR: ICD-10-CM

## 2022-12-14 DIAGNOSIS — R87.810 CERVICAL HIGH RISK HPV (HUMAN PAPILLOMAVIRUS) TEST POSITIVE: Primary | ICD-10-CM

## 2022-12-14 LAB — HCG UR QL: NEGATIVE

## 2022-12-14 PROCEDURE — 57454 BX/CURETT OF CERVIX W/SCOPE: CPT | Performed by: FAMILY MEDICINE

## 2022-12-14 PROCEDURE — 81025 URINE PREGNANCY TEST: CPT | Performed by: FAMILY MEDICINE

## 2022-12-14 PROCEDURE — 88305 TISSUE EXAM BY PATHOLOGIST: CPT | Performed by: PATHOLOGY

## 2022-12-14 ASSESSMENT — PAIN SCALES - GENERAL: PAINLEVEL: NO PAIN (0)

## 2022-12-14 NOTE — PROGRESS NOTES
"  {PROVIDER CHARTING PREFERENCE:879774}    Subjective   Mela is a 42 year old{ACCOMPANIED BY STATEMENT (Optional):460676}, presenting for the following health issues:  Procedure      HPI     Procedure    {SUPERLIST (Optional):198123}  {additonal problems for provider to add (Optional):817153}    Review of Systems   {ROS COMP (Optional):866762}      Objective    /80   Pulse 76   Temp 97.6  F (36.4  C) (Temporal)   Resp 20   Ht 1.641 m (5' 4.6\")   Wt 65.9 kg (145 lb 4 oz)   LMP  (LMP Unknown)   SpO2 99%   BMI 24.47 kg/m    Body mass index is 24.47 kg/m .  Physical Exam   {Exam List (Optional):257458}    {Diagnostic Test Results (Optional):385760}    {AMBULATORY ATTESTATION (Optional):438263}            "

## 2022-12-14 NOTE — PROCEDURES
Procedure note:    Indication:  normal pap with positive high risk HPV.  S/P LEEP about 18 years ago    Before the procedure, she was educated about the role of HPV and the natural history of its infection as well as ways to minimize future risk, its effect on the cervix, and treatment options available should they be indicated. Discussed with her in details about the procedure as well as its potential risks, including missed diagnoses, pain, infection and bleeding. All questions were answered before proceeding and the consent was obtained.      Time out performed.  Patient identified times three.  Name of procedure and location of the procedure to be done also identfied.    Patient was placed in a lithotomy position. The perenium was examed under the microscope with and without the green filter and it was normal.  No suspicious lesion noted.  A sterile speculum was then inserted in a usual manner and the cervix was fully visualized in 360 degrees. SCJ was not seen due to history of LEEP.  No bleeding.  Vinegar solution applied to the cervix for 2 minutes. No aceto white was noted.  Random biopsy performed in a usual manner at these 1 and 8 o'clock positions with no complication. Endocervical biopsy with curette and brush performed and she tolerated these procedures well.  Pending for pathology. Munsil solution applied directly into the cervix and bleeding was minimal.  Patient toterated the procedure well. Post procedure care discussed and explained in details.  Instruct her with no tampon, intercourse or douching for 1 weeks.  Educate symptoms to call in or to follow up, including fever, severe abdominal, heavy bleeding or if she has any concern.  Tylenol or Ibuprofen as needed for pain or cramping.  Call in if has any questions      Bimanual examination: was not done  Pap repeated?:  No  SCJ seen?:  No due to history of LEEP.  Endocervical speculum needed?:  No  ECC done?:  Yes   Lugol's solution used?:   No  Satisfactory examination?:  yes    Vaginal vault: normal to cursory inspection   Urethra normal?:  yes  Labia normal?:  yes  Perineum normal?:  yes    FINDINGS:    Cervix: no visible lesions  Procedure: biopsies taken (not including ECC): 2.    Procedure summary: Patient tolerated procedure well     Assessment: normlal      Plan: Specimens labelled and sent to pathology. Further treatment base on pathology findings.  Post biopsy instructions discussed to patient in details. Will call her once the result is available.      Maral Ybarra MD.

## 2022-12-14 NOTE — PROGRESS NOTES
Colposcopy     Lisa is here today for colposcopy with cervical and endocervical biopsy.  Her last pap smear couple month ago showed normal pap with positive for high risk HPV 16.  She was recommended for colposcopy with possible cervical and endocervical biopsy.  Stated that she was well informed by the nursing staff about the procedure and has no concern about it today.  She had LEEP about 18 years ago and pap smear have been normal since then.  No vaginal discharge.  Unknown LMP due to being on BC pill. No other concern today.    Previous history of abnormal paps?: yes - 18 years ago; had LEEP  History of cryotherapy (freezing)?: no  History of veneral diseases: no  Do you desire testing for any of these diseases? no  History of genital warts:  no  Visible warts now?  no   Type of contraception: Pills  History of sexual abuse:  Denied  Personal Hx of Cancer? NO  Family Hx of Cancer? Cervical cancer  NIKUNJ Exposure?  NO  Previous Hysterectomy?  No  Other Gyn Surgery? none  Partner(s) with warts?: No    Reason for Colposcopy: HPV 16  Marital status:    Number of pregnancies:  3         Children:   3  Abnormal bleeding? No  Abnormal discharge? No  Types of contraception (lifetime): BC pill  Smoker:  no    Problem list and histories reviewed & adjusted, as indicated.  Additional history: as documented    Past Medical History:   Diagnosis Date     Abnormal liver function tests 08/02/2015     ASCUS on Pap smear 9/8/08, 1/20/09    + high risk HPV (done at outside facility)     Gastro-esophageal reflux disease without esophagitis 06/25/2013     History of colposcopy with cervical biopsy 10/11/01, 9/3/09    2001-biopsy = SYDNIE 2.  2009-negative     LSIL (low grade squamous intraepithelial lesion) on Pap smear 05/10/2001     Moderate cervical dysplasia 09/08/2009    5/10/01 pap LSIL 9/12/01 pap ASCUS favor dysplasia 10/11/01 colposcopy/ bx (CIN2)/ECC (negative)/ pap (ASCUS) recom: repeat pap q 3months x 1 year 2002  "LEEP 7/26/02 pap NIL, 12/27/02 pap NIL, 4/3/03 pap NIL, 7/24/07 pap NIL 9/8/08 pap ASCUS + high risk HPV  1/20/09 pap ASCUS + high risk HPV 9/3/09 pap NIL/ colposcopy- bx (negative)/ ECC (negative) repeat pap in one year  5/5/11 reminder call 6/17/       Past Surgical History:   Procedure Laterality Date     CHOLECYSTECTOMY       HC COLP CERVIX/UPPER VAGINA W LOOP ELEC BX CERVIX  2001     LEEP TX, CERVICAL  2002     TUBAL LIGATION          Current Outpatient Medications   Medication Sig Dispense Refill     desogestrel-ethinyl estradiol (APRI) 0.15-30 MG-MCG tablet Patient will take 21 days of active medicine and will skip the 7 days of placebo and start a new pack she will only take the placebo twice a year 84 tablet 3     naproxen (NAPROSYN) 500 MG tablet Take 1 tablet (500 mg) by mouth 2 times daily as needed for moderate pain 60 tablet 1          Allergies   Allergen Reactions     No Known Drug Allergies        ROS:  Constitutional, HEENT, cardiovascular, pulmonary, gi and gu systems are negative, except as otherwise noted.    OBJECTIVE:                                                      /80   Pulse 76   Temp 97.6  F (36.4  C) (Temporal)   Resp 20   Ht 1.641 m (5' 4.6\")   Wt 65.9 kg (145 lb 4 oz)   LMP  (LMP Unknown)   SpO2 99%   BMI 24.47 kg/m      GENERAL: healthy, alert and no distress   RESP: lungs clear to auscultation - no rales, rhonchi or wheezes  CV: regular rate and rhythm, no murmur.   (female): normal female external genitalia, vaginal mucosa.      Diagnostic Test Results:    Results for orders placed or performed in visit on 12/14/22   HCG qualitative urine     Status: Normal   Result Value Ref Range    hCG Urine Qualitative Negative Negative          ASSESSMENT/PLAN:                                                        ICD-10-CM    1. Cervical high risk HPV (human papillomavirus) test positive  R87.810 HCG qualitative urine     HCG qualitative urine     Colposcopy, with biopsy & " curettage     SURGICAL PATHOLOGY EXAM      2. Hx of abnormal cervical Pap smear  Z87.42 Colposcopy, with biopsy & curettage     SURGICAL PATHOLOGY EXAM          Recent pap showed she has normal with high risk HPV 16.  She has a history of abnormal pap 18 years ago and had LEEP - no more abnormal pap since then.  I reviewed the medical record closely and discussed with her about the significance of the findings.  I agreed with the recommendations of colposcopy with possible cervical biopsy.  Discussed with her in details colposcopy procedure and please see the procedural note for further details.  Also discussed about post procedural cares.  She was also educated about symptoms to call in or be seen.  All of her questions were answered.    Maral Augustine Mai, MD  [facility]

## 2022-12-16 LAB
PATH REPORT.COMMENTS IMP SPEC: NORMAL
PATH REPORT.COMMENTS IMP SPEC: NORMAL
PATH REPORT.FINAL DX SPEC: NORMAL
PATH REPORT.GROSS SPEC: NORMAL
PATH REPORT.MICROSCOPIC SPEC OTHER STN: NORMAL
PATH REPORT.RELEVANT HX SPEC: NORMAL
PHOTO IMAGE: NORMAL

## 2022-12-20 DIAGNOSIS — M25.512 LEFT SHOULDER PAIN, UNSPECIFIED CHRONICITY: ICD-10-CM

## 2022-12-20 RX ORDER — NAPROXEN 500 MG/1
TABLET ORAL
Qty: 60 TABLET | Refills: 1 | Status: SHIPPED | OUTPATIENT
Start: 2022-12-20 | End: 2023-12-05

## 2022-12-20 NOTE — TELEPHONE ENCOUNTER
"Requested Prescriptions   Pending Prescriptions Disp Refills    naproxen (NAPROSYN) 500 MG tablet [Pharmacy Med Name: NAPROXEN 500MG TABS] 60 tablet 1     Sig: TAKE ONE TABLET (500 MG) BY MOUTH 2 TIMES DAILY AS NEEDED FOR MODERATE PAIN       NSAID Medications Failed - 12/20/2022  5:04 AM        Failed - Normal ALT on file in past 12 months     Recent Labs   Lab Test 11/06/20  1147   ALT 39             Failed - Normal AST on file in past 12 months     Recent Labs   Lab Test 11/06/20  1147   AST 14             Failed - Normal CBC on file in past 12 months     No lab results found.              Failed - Normal serum creatinine on file in past 12 months     Recent Labs   Lab Test 11/06/20  1147   CR 0.75       Ok to refill medication if creatinine is low          Passed - Blood pressure under 140/90 in past 12 months     BP Readings from Last 3 Encounters:   12/14/22 122/80   10/28/22 106/66   06/22/22 110/70                 Passed - Recent (12 mo) or future (30 days) visit within the authorizing provider's specialty     Patient has had an office visit with the authorizing provider or a provider within the authorizing providers department within the previous 12 mos or has a future within next 30 days. See \"Patient Info\" tab in inbasket, or \"Choose Columns\" in Meds & Orders section of the refill encounter.              Passed - Patient is age 6-64 years        Passed - Medication is active on med list        Passed - No active pregnancy on record        Passed - No positive pregnancy test in past 12 months             "

## 2022-12-28 ENCOUNTER — PATIENT OUTREACH (OUTPATIENT)
Dept: FAMILY MEDICINE | Facility: CLINIC | Age: 42
End: 2022-12-28

## 2022-12-28 DIAGNOSIS — N87.1 CIN II (CERVICAL INTRAEPITHELIAL NEOPLASIA II): Primary | ICD-10-CM

## 2023-11-01 DIAGNOSIS — Z30.011 ENCOUNTER FOR INITIAL PRESCRIPTION OF CONTRACEPTIVE PILLS: ICD-10-CM

## 2023-11-01 RX ORDER — DESOGESTREL AND ETHINYL ESTRADIOL 0.15-0.03
KIT ORAL
Qty: 84 TABLET | Refills: 0 | Status: SHIPPED | OUTPATIENT
Start: 2023-11-01 | End: 2023-12-29

## 2023-11-01 NOTE — TELEPHONE ENCOUNTER
3-month supply refilled, please follow-up before med run out.  She needs a physical and general follow-up.  Thank you

## 2023-11-02 NOTE — TELEPHONE ENCOUNTER
Patient has been notified of the note below via [employer]. Reminder set for 3 days to send letter if not read.

## 2023-12-01 PROBLEM — N87.1 CIN II (CERVICAL INTRAEPITHELIAL NEOPLASIA II): Status: ACTIVE | Noted: 2017-08-06

## 2023-12-04 ASSESSMENT — ENCOUNTER SYMPTOMS
PARESTHESIAS: 0
NAUSEA: 0
HEADACHES: 1
SHORTNESS OF BREATH: 0
DYSURIA: 0
PALPITATIONS: 0
HEMATURIA: 0
DIARRHEA: 0
JOINT SWELLING: 0
ABDOMINAL PAIN: 0
ARTHRALGIAS: 0
FREQUENCY: 0
WEAKNESS: 0
NERVOUS/ANXIOUS: 0
HEMATOCHEZIA: 0
MYALGIAS: 0
FEVER: 0
EYE PAIN: 0
BREAST MASS: 0
HEARTBURN: 0
DIZZINESS: 0
CHILLS: 0
COUGH: 0
CONSTIPATION: 0
SORE THROAT: 0

## 2023-12-05 ENCOUNTER — OFFICE VISIT (OUTPATIENT)
Dept: FAMILY MEDICINE | Facility: CLINIC | Age: 43
End: 2023-12-05
Payer: COMMERCIAL

## 2023-12-05 VITALS
OXYGEN SATURATION: 100 % | RESPIRATION RATE: 16 BRPM | TEMPERATURE: 97.6 F | HEART RATE: 80 BPM | WEIGHT: 145.4 LBS | SYSTOLIC BLOOD PRESSURE: 112 MMHG | DIASTOLIC BLOOD PRESSURE: 70 MMHG | HEIGHT: 65 IN | BODY MASS INDEX: 24.22 KG/M2

## 2023-12-05 DIAGNOSIS — F17.200 TOBACCO USE DISORDER: ICD-10-CM

## 2023-12-05 DIAGNOSIS — Z87.410 HISTORY OF CERVICAL DYSPLASIA: ICD-10-CM

## 2023-12-05 DIAGNOSIS — B97.7 HIGH RISK HPV INFECTION: ICD-10-CM

## 2023-12-05 DIAGNOSIS — Z00.00 ROUTINE GENERAL MEDICAL EXAMINATION AT A HEALTH CARE FACILITY: Primary | ICD-10-CM

## 2023-12-05 DIAGNOSIS — G43.E09 CHRONIC MIGRAINE WITH AURA WITHOUT STATUS MIGRAINOSUS, NOT INTRACTABLE: ICD-10-CM

## 2023-12-05 LAB
ALBUMIN SERPL BCG-MCNC: 4.2 G/DL (ref 3.5–5.2)
ALP SERPL-CCNC: 83 U/L (ref 40–150)
ALT SERPL W P-5'-P-CCNC: 19 U/L (ref 0–50)
ANION GAP SERPL CALCULATED.3IONS-SCNC: 11 MMOL/L (ref 7–15)
AST SERPL W P-5'-P-CCNC: 20 U/L (ref 0–45)
BILIRUB SERPL-MCNC: 0.2 MG/DL
BUN SERPL-MCNC: 4.3 MG/DL (ref 6–20)
CALCIUM SERPL-MCNC: 9.2 MG/DL (ref 8.6–10)
CHLORIDE SERPL-SCNC: 103 MMOL/L (ref 98–107)
CHOLEST SERPL-MCNC: 174 MG/DL
CREAT SERPL-MCNC: 0.72 MG/DL (ref 0.51–0.95)
DEPRECATED HCO3 PLAS-SCNC: 23 MMOL/L (ref 22–29)
EGFRCR SERPLBLD CKD-EPI 2021: >90 ML/MIN/1.73M2
GLUCOSE SERPL-MCNC: 102 MG/DL (ref 70–99)
HBV SURFACE AB SERPL IA-ACNC: 5.79 M[IU]/ML
HBV SURFACE AB SERPL IA-ACNC: NONREACTIVE M[IU]/ML
HDLC SERPL-MCNC: 39 MG/DL
LDLC SERPL CALC-MCNC: 79 MG/DL
NONHDLC SERPL-MCNC: 135 MG/DL
POTASSIUM SERPL-SCNC: 3.6 MMOL/L (ref 3.4–5.3)
PROT SERPL-MCNC: 7.6 G/DL (ref 6.4–8.3)
SODIUM SERPL-SCNC: 137 MMOL/L (ref 135–145)
TRIGL SERPL-MCNC: 281 MG/DL

## 2023-12-05 PROCEDURE — 90715 TDAP VACCINE 7 YRS/> IM: CPT | Performed by: FAMILY MEDICINE

## 2023-12-05 PROCEDURE — 80053 COMPREHEN METABOLIC PANEL: CPT | Performed by: FAMILY MEDICINE

## 2023-12-05 PROCEDURE — 99396 PREV VISIT EST AGE 40-64: CPT | Mod: 25 | Performed by: FAMILY MEDICINE

## 2023-12-05 PROCEDURE — 99213 OFFICE O/P EST LOW 20 MIN: CPT | Mod: 25 | Performed by: FAMILY MEDICINE

## 2023-12-05 PROCEDURE — 86706 HEP B SURFACE ANTIBODY: CPT | Performed by: FAMILY MEDICINE

## 2023-12-05 PROCEDURE — 87624 HPV HI-RISK TYP POOLED RSLT: CPT | Performed by: FAMILY MEDICINE

## 2023-12-05 PROCEDURE — 90471 IMMUNIZATION ADMIN: CPT | Performed by: FAMILY MEDICINE

## 2023-12-05 PROCEDURE — G0145 SCR C/V CYTO,THINLAYER,RESCR: HCPCS | Performed by: FAMILY MEDICINE

## 2023-12-05 PROCEDURE — 36415 COLL VENOUS BLD VENIPUNCTURE: CPT | Performed by: FAMILY MEDICINE

## 2023-12-05 PROCEDURE — 80061 LIPID PANEL: CPT | Performed by: FAMILY MEDICINE

## 2023-12-05 RX ORDER — SUMATRIPTAN 50 MG/1
50 TABLET, FILM COATED ORAL
Qty: 12 TABLET | Refills: 1 | Status: SHIPPED | OUTPATIENT
Start: 2023-12-05

## 2023-12-05 RX ORDER — NORTRIPTYLINE HCL 10 MG
10-20 CAPSULE ORAL AT BEDTIME
Qty: 60 CAPSULE | Refills: 1 | Status: SHIPPED | OUTPATIENT
Start: 2023-12-05 | End: 2024-01-29

## 2023-12-05 ASSESSMENT — ENCOUNTER SYMPTOMS
HEARTBURN: 0
NERVOUS/ANXIOUS: 0
COUGH: 0
WEAKNESS: 0
FREQUENCY: 0
FEVER: 0
NAUSEA: 0
CHILLS: 0
SHORTNESS OF BREATH: 0
BREAST MASS: 0
DIARRHEA: 0
EYE PAIN: 0
MYALGIAS: 0
DIZZINESS: 0
PARESTHESIAS: 0
DYSURIA: 0
JOINT SWELLING: 0
PALPITATIONS: 0
HEMATURIA: 0
HEMATOCHEZIA: 0
ARTHRALGIAS: 0
HEADACHES: 1
ABDOMINAL PAIN: 0
CONSTIPATION: 0
SORE THROAT: 0

## 2023-12-05 ASSESSMENT — PAIN SCALES - GENERAL: PAINLEVEL: NO PAIN (0)

## 2023-12-05 NOTE — NURSING NOTE
Prior to immunization administration, verified patients identity using patient s name and date of birth. Please see Immunization Activity for additional information.     Screening Questionnaire for Adult Immunization    Are you sick today?   No   Do you have allergies to medications, food, a vaccine component or latex?   No   Have you ever had a serious reaction after receiving a vaccination?   No   Do you have a long-term health problem with heart, lung, kidney, or metabolic disease (e.g., diabetes), asthma, a blood disorder, no spleen, complement component deficiency, a cochlear implant, or a spinal fluid leak?  Are you on long-term aspirin therapy?   No   Do you have cancer, leukemia, HIV/AIDS, or any other immune system problem?   No   Do you have a parent, brother, or sister with an immune system problem?   No   In the past 3 months, have you taken medications that affect  your immune system, such as prednisone, other steroids, or anticancer drugs; drugs for the treatment of rheumatoid arthritis, Crohn s disease, or psoriasis; or have you had radiation treatments?   No   Have you had a seizure, or a brain or other nervous system problem?   No   During the past year, have you received a transfusion of blood or blood    products, or been given immune (gamma) globulin or antiviral drug?   No   For women: Are you pregnant or is there a chance you could become       pregnant during the next month?   No   Have you received any vaccinations in the past 4 weeks?   No     Immunization questionnaire answers were all negative.      Patient instructed to remain in clinic for 15 minutes afterwards, and to report any adverse reactions.     Screening performed by Mercedes Dotson MA on 12/5/2023 at 8:29 AM.

## 2023-12-05 NOTE — PROGRESS NOTES
SUBJECTIVE:   Mela is a 43 year old, presenting for the following:  Physical        12/5/2023     7:43 AM   Additional Questions   Roomed by Amanda ocampo       Healthy Habits:     Getting at least 3 servings of Calcium per day:  NO    Bi-annual eye exam:  Yes    Dental care twice a year:  Yes    Sleep apnea or symptoms of sleep apnea:  None    Diet:  Regular (no restrictions)    Frequency of exercise:  None    Taking medications regularly:  Yes    Medication side effects:  Not applicable and None    Additional concerns today:  No      Today's PHQ-2 Score:       12/4/2023     2:12 PM   PHQ-2 ( 1999 [employer])   Q1: Little interest or pleasure in doing things 0   Q2: Feeling down, depressed or hopeless 0   PHQ-2 Score 0   Q1: Little interest or pleasure in doing things Not at all   Q2: Feeling down, depressed or hopeless Not at all   PHQ-2 Score 0       Lisa is a 42-year-old woman here today for physical and general follow up.  No specific concerns today except of the headaches that she has been having on and off for over a years and it is getting worse.  It comes and goes with unknown trigger - lasting from hours to 2.5 days.  Been having 3-4 times a week in the last several weeks.  Throbbing headache that is localized to the frontal area with significant pressure behind her eyes; no associated neck stiffness or tension headache.  Light and noise would make it worse.  She usually sees some waves or spike in the air sensation before the headache comes.  Excedrin migraine headache has helped some.  No associated neck stiffness.  No sinus pressure or pain.  No history of head injury.  No personal or family history of history of migraine headache.  When the headache is bad, she will feel nausea.      Otherwise doing well.  No major changes to her medical record since last time.  Take only oral contraceptive pills.  Still having spotting on and off despite of taking continuous birth control pill.  Been having hot  flashes symptoms but they are not too bad.  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  Pap smears have been normal since.  Last year Pap smear was normal but was positive for HR HPV 16.  She has a colposcopy which was normal and negative biopsy.    Overall doing well, no other concerns today.  No major medical care or procedure done since the last physical about a year ago.  No fever, chills, shortness of breath, chest pain, nausea, vomiting, diarrhea, or constipation.  No runny nose, nasal congestion, coughing, sore throat or sinus pain/pressure.  No problems with sleeping.  No safety or mental health concerns.  Drinks alcohol socially on weekends and smokes approximately 1 pack/day for over 20 years - not ready to quit.  No drugs.  No weight change, not exercising.  No other concerns today.      Social History     Tobacco Use    Smoking status: Every Day     Packs/day: 1.00     Years: 21.00     Additional pack years: 0.00     Total pack years: 21.00     Types: Cigarettes    Smokeless tobacco: Never    Tobacco comments:     started age 16   Substance Use Topics    Alcohol use: Yes     Comment: socially             12/4/2023     2:12 PM   Alcohol Use   Prescreen: >3 drinks/day or >7 drinks/week? Not Applicable       Reviewed orders with patient.  Reviewed health maintenance and updated orders accordingly - Yes  BP Readings from Last 3 Encounters:   12/05/23 112/70   12/14/22 122/80   10/28/22 106/66    Wt Readings from Last 3 Encounters:   12/05/23 66 kg (145 lb 6.4 oz)   12/14/22 65.9 kg (145 lb 4 oz)   10/28/22 65.8 kg (145 lb)                  Patient Active Problem List   Diagnosis    History of cervical dysplasia - SYDNIE II    Tobacco use disorder    Chronic migraine with aura without status migrainosus, not intractable    High risk HPV infection     Past Surgical History:   Procedure Laterality Date    CHOLECYSTECTOMY      HC COLP CERVIX/UPPER VAGINA  W LOOP ELEC BX CERVIX  2001    LEEP TX, CERVICAL  2002    TUBAL LIGATION         Social History     Tobacco Use    Smoking status: Every Day     Packs/day: 1.00     Years: 21.00     Additional pack years: 0.00     Total pack years: 21.00     Types: Cigarettes    Smokeless tobacco: Never    Tobacco comments:     started age 16   Substance Use Topics    Alcohol use: Yes     Comment: socially     Family History   Problem Relation Age of Onset    Hypertension Mother     Hypertension Father     Gout Father     Cancer Father         myofribosis    No Known Problems Brother     No Known Problems Sister     Hypertension Maternal Grandmother     Neurologic Disorder Maternal Grandfather         ALS    Unknown/Adopted Paternal Grandmother     Cancer Paternal Grandfather         unsure    No Known Problems Son     No Known Problems Daughter     No Known Problems Son     Family History Negative No family hx of          Current Outpatient Medications   Medication Sig Dispense Refill    desogestrel-ethinyl estradiol (JULEBER) 0.15-30 MG-MCG tablet TAKE ONE TABLET BY MOUTH ONCE DAILY (ACTIVE TABLETS ONLY) CONTINUOUSLY, ONLY TWICE YEARLY MENSES 84 tablet 0    nortriptyline (PAMELOR) 10 MG capsule Take 1-2 capsules (10-20 mg) by mouth at bedtime 60 capsule 1    SUMAtriptan (IMITREX) 50 MG tablet Take 1 tablet (50 mg) by mouth at onset of headache for migraine May repeat in 2 hours. Max 4 tablets/24 hours. 12 tablet 1     Allergies   Allergen Reactions    No Known Drug Allergy      Recent Labs   Lab Test 12/05/23  0855 11/06/20  1147   LDL 79 84   HDL 39* 38*   TRIG 281* 172*   ALT 19 39   CR 0.72 0.75   GFRESTIMATED >90 >90   GFRESTBLACK  --  >90   POTASSIUM 3.6 4.1   TSH  --  1.11        Breast Cancer Screening:    FHS-7: no FH of breast cancer      Mammogram Screening - Offered annual screening and updated Health Maintenance for mutual plan based on risk factor consideration  Pertinent mammograms are reviewed under the imaging  "tab.    History of abnormal Pap smear: YES - updated in Problem List and Health Maintenance accordingly      Latest Ref Rng & Units 10/28/2022    12:40 PM 11/6/2020    11:35 AM 11/6/2020    11:30 AM   PAP / HPV   PAP  Negative for Intraepithelial Lesion or Malignancy (NILM)      PAP (Historical)   NIL     HPV 16 DNA Negative Positive   Negative    HPV 18 DNA Negative Negative   Negative    Other HR HPV Negative Negative   Negative      Reviewed and updated as needed this visit by clinical staff   Tobacco  Allergies  Meds  Problems  Med Hx  Surg Hx  Fam Hx  Soc   Hx        Reviewed and updated as needed this visit by Provider   Tobacco  Allergies  Meds  Problems  Med Hx  Surg Hx  Fam Hx  Soc   Hx           Review of Systems   Constitutional:  Negative for chills and fever.   HENT:  Negative for congestion, ear pain, hearing loss and sore throat.    Eyes:  Negative for pain and visual disturbance.   Respiratory:  Negative for cough and shortness of breath.    Cardiovascular:  Negative for chest pain, palpitations and peripheral edema.   Gastrointestinal:  Negative for abdominal pain, constipation, diarrhea, heartburn, hematochezia and nausea.   Breasts:  Negative for tenderness, breast mass and discharge.   Genitourinary:  Positive for vaginal bleeding and vaginal discharge. Negative for dysuria, frequency, genital sores, hematuria, pelvic pain and urgency.   Musculoskeletal:  Negative for arthralgias, joint swelling and myalgias.   Skin:  Negative for rash.   Neurological:  Positive for headaches. Negative for dizziness, weakness and paresthesias.   Psychiatric/Behavioral:  Negative for mood changes. The patient is not nervous/anxious.         OBJECTIVE:   /70   Pulse 80   Temp 97.6  F (36.4  C) (Temporal)   Resp 16   Ht 1.641 m (5' 4.6\")   Wt 66 kg (145 lb 6.4 oz)   LMP 11/21/2023 (Approximate)   SpO2 100%   BMI 24.50 kg/m    Physical Exam  GENERAL: healthy, alert and no distress.  " Speaking in full sentences.  EYES: Eyes grossly normal to inspection, PERRL and conjunctivae and sclerae normal.  No nystagmus.  All 4 visual fields intact.  HENT: ear canals and TM's normal.  Nares are non-congested. Oropharynx is pink and moist. No tender with palpation to the sinuses.   NECK: no adenopathy, supple, no lymphadenopathy or thyromegaly.  No tender with palpation to the cervical spine or its paraspinous muscle.  RESP: lungs clear to auscultation - no rales, rhonchi or wheezes.  Good respiratory effort throughout.  BREAST: Offered but declined, she has no concern about it.   CV: regular rate and rhythm, no murmur  ABDOMEN: soft, nontender, nondistended, no palpable masses organomegaly with normal culture.  No CVA tenderness.   (female): Normal genitalia.  No external lesions noted.  Birth canal looked normal, no abnormal vaginal discharge.  Cervix looked normal.  No tender with pressuring onto the cervix.  MS: no gross musculoskeletal defects noted, no edema. All joints are in the normal range of motion and 4 extremities were equally in strength. Hips, knees, ankles, shoulders, elbows, wrists exams were normal. Fine motor skills of the fingers are intact. Back is straight, no tender with palpation to the spine.  SKIN: no suspicious lesions or rashes  NEURO: Normal strength and tone, mentation intact and speech normal.  Cranial nerve II through XII intact.  DTRs +2 throughout.  No focal neurological deficit.  PSYCH: mentation appears normal, affect normal/bright.  Thoughts intact, suicidal/homicidal ideation.  No hallucination.      Diagnostic Test Results:  Labs reviewed in Epic  Results for orders placed or performed in visit on 12/05/23   Hepatitis B Surface Antibody     Status: None   Result Value Ref Range    Hepatitis B Surface Antibody Instrument Value 5.79 <8.00 m[IU]/mL    Hepatitis B Surface Antibody Nonreactive    Lipid panel reflex to direct LDL Fasting     Status: Abnormal   Result Value  Ref Range    Cholesterol 174 <200 mg/dL    Triglycerides 281 (H) <150 mg/dL    Direct Measure HDL 39 (L) >=50 mg/dL    LDL Cholesterol Calculated 79 <=100 mg/dL    Non HDL Cholesterol 135 (H) <130 mg/dL    Narrative    Cholesterol  Desirable:  <200 mg/dL    Triglycerides  Normal:  Less than 150 mg/dL  Borderline High:  150-199 mg/dL  High:  200-499 mg/dL  Very High:  Greater than or equal to 500 mg/dL    Direct Measure HDL  Female:  Greater than or equal to 50 mg/dL   Male:  Greater than or equal to 40 mg/dL    LDL Cholesterol  Desirable:  <100mg/dL  Above Desirable:  100-129 mg/dL   Borderline High:  130-159 mg/dL   High:  160-189 mg/dL   Very High:  >= 190 mg/dL    Non HDL Cholesterol  Desirable:  130 mg/dL  Above Desirable:  130-159 mg/dL  Borderline High:  160-189 mg/dL  High:  190-219 mg/dL  Very High:  Greater than or equal to 220 mg/dL   Comprehensive metabolic panel (BMP + Alb, Alk Phos, ALT, AST, Total. Bili, TP)     Status: Abnormal   Result Value Ref Range    Sodium 137 135 - 145 mmol/L    Potassium 3.6 3.4 - 5.3 mmol/L    Carbon Dioxide (CO2) 23 22 - 29 mmol/L    Anion Gap 11 7 - 15 mmol/L    Urea Nitrogen 4.3 (L) 6.0 - 20.0 mg/dL    Creatinine 0.72 0.51 - 0.95 mg/dL    GFR Estimate >90 >60 mL/min/1.73m2    Calcium 9.2 8.6 - 10.0 mg/dL    Chloride 103 98 - 107 mmol/L    Glucose 102 (H) 70 - 99 mg/dL    Alkaline Phosphatase 83 40 - 150 U/L    AST 20 0 - 45 U/L    ALT 19 0 - 50 U/L    Protein Total 7.6 6.4 - 8.3 g/dL    Albumin 4.2 3.5 - 5.2 g/dL    Bilirubin Total 0.2 <=1.2 mg/dL   HPV High Risk Types DNA Cervical     Status: Abnormal   Result Value Ref Range    Other HR HPV Negative Negative    HPV16 DNA Positive (A) Negative    HPV18 DNA Negative Negative    FINAL DIAGNOSIS       This patient's sample is positive for HPV 16 DNA.    This test was developed and its performance characteristics determined by the [facility]. It has not been  cleared or approved by the FDA. The laboratory is regulated under CLIA as qualified to perform high-complexity testing. This test is used for clinical purposes. It should not be regarded as investigational or for research.    METHODOLOGY: The Roche Matteo 4800 system uses automated extraction, simultaneous amplification of HPV (L1 region) and beta-globin, followed by real time detection of fluorescent labeled HPV and beta globin using specific oligonucleotide probes. The test specifically identifies types HPV 16 DNA and HPV 18 DNA while concurrently detecting the rest of the high risk types (31, 33, 35, 39, 45, 51, 52, 56, 58, 59, 66 or 68).    COMMENTS: This test is not intended for use as a screening device for woman under age 30 with normal cervical cytology. Results should be correlated with cytologic and histologic findings. Close clinical followup is recommended.       Pap Screen with HPV - recommended age 30 - 65 years     Status: None   Result Value Ref Range    Interpretation        Negative for Intraepithelial Lesion or Malignancy (NILM)    Comment         Papanicolaou Test Limitations:  Cervical cytology is a screening test with limited sensitivity, and regular screening is critical for cancer prevention.  Pap tests are primarily effective for the diagnosis/prevention of squamous cell carcinoma, not adenocarcinoma or other cancers.        Specimen Adequacy       Satisfactory for evaluation, endocervical/transformation zone component present    Clinical Information       none      Reflex Testing Yes regardless of result     Previous Abnormal?       No      Performing Labs       The technical component of this testing was completed at [facility]         ASSESSMENT/PLAN:   (Z00.00) Routine general medical examination at a health care facility  (primary encounter diagnosis)  Comment:  Overall Lisa is healthy and is doing well.  UTD for  immunizations except of COVID vaccination which were offered and recommended but she declined.  Risk and benefit discussed, she he is willing to take the risk.  Hepatitis B immunity were checked today which indicate that she is not immunized to hepatitis B vaccination and therefore she was recommended to catch it up at her convenience.  Discussed about safety issue, healthy diet, exercising, weight management, good sleeping hygiene, advanced directive care, healthy alcohol consumption, and depression prevention. Recommended daily exercise for at least 30 minutes.  Emphasized on healthy diet with adequate fluid intake and resting.  No safety or mental health concerns.  Follow in 1 year for physical, earlier as needed.  Labs as ordered and results reviewed.  All of her question was answered.    Colon cancer screening: To be done at about 47.  No family history of colon cancer.    Breast cancer screening: To be done at about 47.  Early breast cancer screening discussed and offered but she declined.  There is no family history of breast cancer.    Cervical cancer screening: Please see below for further details.    STD screening: She has no concern of STD and declined screening today.    Perimenopausal management: Discussed about hot flash symptoms and its treatment options.  Her hot flash symptoms has been minimal and tolerable.  Will continue to monitor.  Recommended to try OTC Black Cohosh as needed.  Recommend to reserve estrogen supplement as the last resort and it should be used for only short-term intention.    Contraception: She been doing well with the continuous birth control pills.  Discussed about birth control options but she is overall happy with the current BCP.      Plan: Hepatitis B Surface Antibody, Lipid panel         reflex to direct LDL Fasting, Comprehensive         metabolic panel (BMP + Alb, Alk Phos, ALT, AST,        Total. Bili, TP)              (Z87.410) History of cervical dysplasia  (B97.7)  High risk HPV infection  Comment: Has a history LGSIL and a LEEP procedure in 2002 which showed SYDNIE-2, followed by ASCUS in 2006 and 2009.  Pap smears have been normal since.  Last year Pap smear was normal but was positive for HR HPV 16.  She has a colposcopy which was normal and negative biopsy.    Pap smear with HPV obtained today.  Pap smear was again normal but HPV 16 persisted.  Due to history of LEEP procedure and cervical dysplasia, will refer her to OB/GYN for further evaluation and management.  In the meantime, she was instructed to continue with cervical cancer screening annually until further instructed otherwise.      (F17.200) Tobacco use disorder  Comment: Been smoking for 21 years, on average about 1 pack/day.  Discussed about potential long and short-term complications of tobacco smoking, and she understands these risks.  Encouraged to stop smoking and discussed about option for smoking cessation aids, including nicotine replacement and Chantix/zyban.  She is not ready to quit smoking at this time.  Recommended reducing number of cigarettes per day.       (G43.E09) Chronic migraine with aura without status migrainosus, not intractable  Comment: Clinical presentation is more suggestive of migraine headache although is somewhat atypical for its onset at her age.  Discussed about further workup with head CT/MRI vs trying to treat for migraine headache with medications.  She preferred to later options and therefore nortriptyline and Imitrex were prescribed.  Potential side effect discussed.  She was instructed to let me know or follow-up if the headache persists or worsens with the nortriptyline and Imitrex.    Plan: nortriptyline (PAMELOR) 10 MG capsule,         SUMAtriptan (IMITREX) 50 MG tablet                Patient has been advised of split billing requirements and indicates understanding: Yes      COUNSELING:  Reviewed preventive health counseling, as reflected in patient instructions        Regular exercise       Healthy diet/nutrition       Immunizations  Declined: Covid-19 due to Conscientious objector             Alcohol Use       Contraception       Family planning       (Becka)menopause management       Advance Care Planning        She reports that she has been smoking cigarettes. She has a 21.00 pack-year smoking history. She has never used smokeless tobacco.  Nicotine/Tobacco Cessation Plan:   Information offered: Patient not interested at this time          Maral Augustine Mai, MD  [facility]

## 2023-12-11 ENCOUNTER — PATIENT OUTREACH (OUTPATIENT)
Dept: FAMILY MEDICINE | Facility: CLINIC | Age: 43
End: 2023-12-11
Payer: COMMERCIAL

## 2023-12-14 ENCOUNTER — TELEPHONE (OUTPATIENT)
Dept: FAMILY MEDICINE | Facility: CLINIC | Age: 43
End: 2023-12-14
Payer: COMMERCIAL

## 2023-12-14 NOTE — CONFIDENTIAL NOTE
Lisa wants to know if she can do her colposcopy again with you since the last one came back abnormal.

## 2023-12-28 DIAGNOSIS — Z30.011 ENCOUNTER FOR INITIAL PRESCRIPTION OF CONTRACEPTIVE PILLS: ICD-10-CM

## 2023-12-29 RX ORDER — DESOGESTREL AND ETHINYL ESTRADIOL 0.15-0.03
KIT ORAL
Qty: 84 TABLET | Refills: 0 | Status: SHIPPED | OUTPATIENT
Start: 2023-12-29 | End: 2024-02-27

## 2024-01-01 PROBLEM — Z12.4 CERVICAL CANCER SCREENING: Status: ACTIVE | Noted: 2024-01-01

## 2024-01-01 PROBLEM — Z12.4 CERVICAL CANCER SCREENING: Status: RESOLVED | Noted: 2024-01-01 | Resolved: 2024-01-01

## 2024-01-01 NOTE — TELEPHONE ENCOUNTER
Please let patient know that I referred her to Dr. Blake for further evaluation of her HPV and history of cervical dysplasia.  She should hear from Dr. Blake's office within the next several days.  Please also inform her that I apologize for the delayed response.

## 2024-01-09 ENCOUNTER — OFFICE VISIT (OUTPATIENT)
Dept: OBGYN | Facility: CLINIC | Age: 44
End: 2024-01-09
Payer: COMMERCIAL

## 2024-01-09 VITALS
DIASTOLIC BLOOD PRESSURE: 81 MMHG | BODY MASS INDEX: 24.45 KG/M2 | OXYGEN SATURATION: 98 % | WEIGHT: 145.1 LBS | SYSTOLIC BLOOD PRESSURE: 133 MMHG | HEART RATE: 97 BPM

## 2024-01-09 DIAGNOSIS — Z87.410 HISTORY OF CERVICAL DYSPLASIA: ICD-10-CM

## 2024-01-09 DIAGNOSIS — B97.7 HIGH RISK HPV INFECTION: ICD-10-CM

## 2024-01-09 PROCEDURE — 57454 BX/CURETT OF CERVIX W/SCOPE: CPT | Performed by: GENERAL PRACTICE

## 2024-01-09 PROCEDURE — 88305 TISSUE EXAM BY PATHOLOGIST: CPT | Performed by: PATHOLOGY

## 2024-01-09 NOTE — PATIENT INSTRUCTIONS
If you have labs or imaging done, the results will automatically release in [employer] without an interpretation.  Your health care professional will review those results and send an interpretation with recommendations as soon as possible, but this may be 1-3 business days.    If you have any questions regarding your visit, please contact your care team.     [facility]: [phone]  [facility] CLINIC HOURS TELEPHONE NUMBER   Mamadou DOUGLAS Jefferson .      Kristy-CHIO Saenz-CHIO [place]-Surgery Scheduler  Enedelia-         Monday-[place]  8:00 am-4:00 pm  [facility] [place]  [facility]4:00 pm  Wednesday-[place] 8:00 am-4:00 pm  Thursday-[place] 8:00 am-4:00 pm    Typical Surgery Day Friday [facility]  [address] [place] [address]  PH: [phone] Fax    Imaging Scheduling all locations  PH: [phone]     [facility] Delivery  [address]  [place], [place] [address] [phone]    [facility]  [address] [place] [address]  PH: [phone]     **Surgeries** Our Surgery Schedulers will contact you to schedule. If you do not receive a call within 3 business days, please call [phone].  Urgent Care locations:  [facility]       Monday-Friday   10 am - 8 pm  Saturday and Sunday   9 am - 5 pm   [phone]   If you need a medication refill, please contact your pharmacy. Please allow 3 business days for your refill to be completed.  As always, Thank you for trusting us with your healthcare needs!

## 2024-01-09 NOTE — PROGRESS NOTES
43 year old  presents for colposcopy.      Indication for procedure: HPV16 (Human Papillomavirus) positive.      There are many types of HPV, but we test Pap samples for the high-risk types. HPV can be the cause of an abnormal Pap smear result.  The high-risk types of HPV can also be related to the potential development of cervical cancer if not monitored and/or treated appropriately  Prior history of cervical dysplasia: Yes     Prior Colposcopy history: Yes- Normal tissue biopsies   Prior LEEP:Yes - LEEP at age 19  Patient's last menstrual period was 2023 (approximate).    Tobacco: Yes- does not desire to quit  Gardasil vaccination status: No  Pregnancy test: +BTL and on OCPs for bleeding  She denies the possibility of pregnancy     Discussed nature of HPV related infection, natural history and association with cervical dysplasia.  Procedure for colposcopy and biopsy was explained to the patient and consent obtained.  All the patient's questions were answered.    PROCEDURE:  COLPOSCOPY  After a procedural timeout was taken, she was positioned in dorsal lithotomy and a speculum was inserted to allow visualization of the cervix. A 5% acetic acid solution was applied to the ectocervix with large swabs. Lugols solution was also applied.  Colposcopic examination was then undertaken as noted below.    FINDINGS:  Physical Exam  /81 (BP Location: Left arm, Patient Position: Sitting, Cuff Size: Adult Regular)   Pulse 97   Wt 65.8 kg (145 lb 1.6 oz)   LMP 2023 (Approximate)   SpO2 98%   BMI 24.45 kg/m     Procedures    Cervix: no mosaicism, no punctation, and no abnormal vasculature; Large ectropion from prior LEEP procedure. Acetic acid applied an AWE noted at 6 and 12 oclock. Lugols applied and uptake changes noted at same location. Biopsies taken at 6 and 12 oclock. ECC performed.     Vaginal inspection: vaginal colposcopy not performed and normal without visible lesions.    Vulvar  colposcopy: vulvar colposcopy not performed and normal mucosa without lesions.    Procedure Summary: Patient tolerated procedure well.    Colposcopic Impression: Normal or CIN1    10 minutes were spent in face to face discussion regarding her abnormal pap, separate from the procedure.     Plan: Specimens labelled and sent to pathology. and Will base further treatment on pathology findings. Recommended smoking cessation; patient declines therapy. Recommend HPV vaccination.      Mamadou Paulino DO, FACOG

## 2024-01-27 DIAGNOSIS — G43.E09 CHRONIC MIGRAINE WITH AURA WITHOUT STATUS MIGRAINOSUS, NOT INTRACTABLE: ICD-10-CM

## 2024-01-29 RX ORDER — NORTRIPTYLINE HCL 10 MG
10-20 CAPSULE ORAL AT BEDTIME
Qty: 60 CAPSULE | Refills: 1 | Status: SHIPPED | OUTPATIENT
Start: 2024-01-29 | End: 2024-03-27

## 2024-02-06 ENCOUNTER — PATIENT OUTREACH (OUTPATIENT)
Dept: OBGYN | Facility: CLINIC | Age: 44
End: 2024-02-06
Payer: COMMERCIAL

## 2024-02-06 PROBLEM — Z87.410 HISTORY OF CERVICAL DYSPLASIA: Status: ACTIVE | Noted: 2017-08-06

## 2024-02-27 DIAGNOSIS — Z30.011 ENCOUNTER FOR INITIAL PRESCRIPTION OF CONTRACEPTIVE PILLS: ICD-10-CM

## 2024-02-27 RX ORDER — DESOGESTREL AND ETHINYL ESTRADIOL 0.15-0.03
KIT ORAL
Qty: 84 TABLET | Refills: 3 | Status: SHIPPED | OUTPATIENT
Start: 2024-02-27

## 2024-03-16 ENCOUNTER — HEALTH MAINTENANCE LETTER (OUTPATIENT)
Age: 44
End: 2024-03-16

## 2024-03-27 DIAGNOSIS — G43.E09 CHRONIC MIGRAINE WITH AURA WITHOUT STATUS MIGRAINOSUS, NOT INTRACTABLE: ICD-10-CM

## 2024-03-27 RX ORDER — NORTRIPTYLINE HCL 10 MG
CAPSULE ORAL
Qty: 60 CAPSULE | Refills: 1 | Status: SHIPPED | OUTPATIENT
Start: 2024-03-27 | End: 2024-05-28

## 2024-05-27 DIAGNOSIS — G43.E09 CHRONIC MIGRAINE WITH AURA WITHOUT STATUS MIGRAINOSUS, NOT INTRACTABLE: ICD-10-CM

## 2024-05-28 RX ORDER — NORTRIPTYLINE HCL 10 MG
CAPSULE ORAL
Qty: 60 CAPSULE | Refills: 1 | Status: SHIPPED | OUTPATIENT
Start: 2024-05-28 | End: 2024-07-24

## 2024-07-24 DIAGNOSIS — G43.E09 CHRONIC MIGRAINE WITH AURA WITHOUT STATUS MIGRAINOSUS, NOT INTRACTABLE: ICD-10-CM

## 2024-07-24 RX ORDER — NORTRIPTYLINE HCL 10 MG
CAPSULE ORAL
Qty: 60 CAPSULE | Refills: 1 | Status: SHIPPED | OUTPATIENT
Start: 2024-07-24

## 2024-10-29 ENCOUNTER — MYC REFILL (OUTPATIENT)
Dept: FAMILY MEDICINE | Facility: CLINIC | Age: 44
End: 2024-10-29
Payer: COMMERCIAL

## 2024-10-29 ENCOUNTER — MYC MEDICAL ADVICE (OUTPATIENT)
Dept: FAMILY MEDICINE | Facility: CLINIC | Age: 44
End: 2024-10-29
Payer: COMMERCIAL

## 2024-10-29 ENCOUNTER — E-VISIT (OUTPATIENT)
Dept: FAMILY MEDICINE | Facility: CLINIC | Age: 44
End: 2024-10-29
Payer: COMMERCIAL

## 2024-10-29 DIAGNOSIS — R51.9 NONINTRACTABLE HEADACHE, UNSPECIFIED CHRONICITY PATTERN, UNSPECIFIED HEADACHE TYPE: ICD-10-CM

## 2024-10-29 DIAGNOSIS — G43.E09 CHRONIC MIGRAINE WITH AURA WITHOUT STATUS MIGRAINOSUS, NOT INTRACTABLE: ICD-10-CM

## 2024-10-29 DIAGNOSIS — R69 DIAGNOSIS UNKNOWN: Primary | ICD-10-CM

## 2024-10-29 PROCEDURE — 99207 PR NON-BILLABLE SERV PER CHARTING: CPT | Performed by: FAMILY MEDICINE

## 2024-10-29 RX ORDER — SUMATRIPTAN 50 MG/1
50 TABLET, FILM COATED ORAL
Qty: 6 TABLET | Refills: 0 | Status: SHIPPED | OUTPATIENT
Start: 2024-10-29 | End: 2024-11-05

## 2024-10-29 NOTE — PATIENT INSTRUCTIONS
Dear Mela,    We are sorry you are not feeling well. Based on the responses you provided, it is recommended that you be seen in-person in clinic so we can better evaluate your symptoms. Please schedule this visit in [employer]. You will have a Schedule Now button in [employer] to help with scheduling this appointment. Otherwise, you can call [phone] to schedule an appointment.     You will not be charged for this eVisit. Thank you for trusting us with your care.     Maral Augustine Mai, MD    Thank you for choosing us for your care. I think an in-clinic or virtual visit would be the best next step based on your symptoms. Please schedule a clinic appointment; you won t be charged for this eVisit.      You can schedule an appointment by clicking here in [employer], or call [phone].

## 2024-11-05 ENCOUNTER — OFFICE VISIT (OUTPATIENT)
Dept: FAMILY MEDICINE | Facility: CLINIC | Age: 44
End: 2024-11-05
Payer: COMMERCIAL

## 2024-11-05 VITALS
RESPIRATION RATE: 16 BRPM | TEMPERATURE: 97.7 F | HEART RATE: 85 BPM | BODY MASS INDEX: 25.46 KG/M2 | SYSTOLIC BLOOD PRESSURE: 128 MMHG | DIASTOLIC BLOOD PRESSURE: 76 MMHG | WEIGHT: 152.8 LBS | OXYGEN SATURATION: 100 % | HEIGHT: 65 IN

## 2024-11-05 DIAGNOSIS — Z12.4 CERVICAL CANCER SCREENING: Primary | ICD-10-CM

## 2024-11-05 DIAGNOSIS — Z30.011 ENCOUNTER FOR INITIAL PRESCRIPTION OF CONTRACEPTIVE PILLS: ICD-10-CM

## 2024-11-05 DIAGNOSIS — G43.E09 CHRONIC MIGRAINE WITH AURA WITHOUT STATUS MIGRAINOSUS, NOT INTRACTABLE: Primary | ICD-10-CM

## 2024-11-05 DIAGNOSIS — G43.E09 CHRONIC MIGRAINE WITH AURA WITHOUT STATUS MIGRAINOSUS, NOT INTRACTABLE: ICD-10-CM

## 2024-11-05 RX ORDER — TOPIRAMATE 25 MG/1
25 TABLET, FILM COATED ORAL 2 TIMES DAILY
Qty: 60 TABLET | Refills: 1 | Status: SHIPPED | OUTPATIENT
Start: 2024-11-05

## 2024-11-05 RX ORDER — SUMATRIPTAN SUCCINATE 100 MG/1
100 TABLET ORAL
Qty: 12 TABLET | Refills: 2 | Status: SHIPPED | OUTPATIENT
Start: 2024-11-05

## 2024-11-05 RX ORDER — DESOGESTREL AND ETHINYL ESTRADIOL 0.15-0.03
KIT ORAL
Qty: 84 TABLET | Refills: 3 | OUTPATIENT
Start: 2024-11-05

## 2024-11-05 ASSESSMENT — PAIN SCALES - GENERAL: PAINLEVEL_OUTOF10: NO PAIN (0)

## 2024-11-05 ASSESSMENT — ENCOUNTER SYMPTOMS: HEADACHES: 1

## 2024-11-05 NOTE — PROGRESS NOTES
"  {PROVIDER CHARTING PREFERENCE:489935}    Subjective   Mela is a 44 year old, presenting for the following health issues:  Headache      11/5/2024     1:17 PM   Additional Questions   Roomed by Fili     Headache     History of Present Illness       Headaches:   Since the patient's last clinic visit, headaches are: worsened  The patient is getting headaches:  Every week 2  or 3  She is not able to do normal daily activities when she has a migraine.  The patient is taking the following rescue/relief medications:  Ibuprofen (Advil, Motrin)   Patient states \"I get no relief\" from the rescue/relief medications.   The patient is taking the following medications to prevent migraines:  No medications to prevent migraines  In the past 4 weeks, the patient has gone to an Urgent Care or Emergency Room 0 times times due to headaches.    She eats 0-1 servings of fruits and vegetables daily.She consumes 2 sweetened beverage(s) daily.She exercises with enough effort to increase her heart rate 20 to 29 minutes per day.  She exercises with enough effort to increase her heart rate 6 days per week.   She is taking medications regularly.       {MA/LPN/RN Pre-Provider Visit Orders- hCG/UA/Strep (Optional):251854}  {SUPERLIST (Optional):053130}  {additonal problems for provider to add (Optional):953183}    {ROS Picklists (Optional):917126}      Objective    /76 (BP Location: Right arm, Patient Position: Sitting, Cuff Size: Adult Regular)   Pulse 85   Temp 97.7  F (36.5  C) (Temporal)   Resp 16   Ht 1.655 m (5' 5.16\")   Wt 69.3 kg (152 lb 12.8 oz)   SpO2 100%   BMI 25.30 kg/m    Body mass index is 25.3 kg/m .  Physical Exam   {Exam List (Optional):334611}    {Diagnostic Test Results (Optional):347293}        Signed Electronically by: Maral Augustine Mai, MD  {Email feedback regarding this note to [email or web address]   :[id number]}  " "following health issues:  Headache      11/5/2024     1:17 PM   Additional Questions   Roomed by Fili     Headache     History of Present Illness       Headaches:   Since the patient's last clinic visit, headaches are: worsened  The patient is getting headaches:  Every week 2  or 3  She is not able to do normal daily activities when she has a migraine.  The patient is taking the following rescue/relief medications:  Ibuprofen (Advil, Motrin)   Patient states \"I get no relief\" from the rescue/relief medications.   The patient is taking the following medications to prevent migraines:  No medications to prevent migraines  In the past 4 weeks, the patient has gone to an Urgent Care or Emergency Room 0 times times due to headaches.    She eats 0-1 servings of fruits and vegetables daily.She consumes 2 sweetened beverage(s) daily.She exercises with enough effort to increase her heart rate 20 to 29 minutes per day.  She exercises with enough effort to increase her heart rate 6 days per week.   She is taking medications regularly.       Mela was seen today for migraine headache follow-up.  Known to have the migraine headache which was doing well with the nortriptyline for prevention.  Stopped taking the nortriptyline in about 6 months ago.  In the last 4-5 months, she has been having more migraine headache, 2-3 times a month.  The are bad and last longer, can last up to couple days.  Typical migraine headache that she has had.  Throbbing headache that is worse with light and noise.  Visual aura with the waves of light  in the air before having the HA.  When it is bad, she also feels nausea.  Nortriptyline was effective but she prefers not to be on it again due to perceived the weight gain side effect.        Review of Systems  Constitutional, HEENT, cardiovascular, pulmonary, gi and gu systems are negative, except as otherwise noted.      Objective    /76 (BP Location: Right arm, Patient Position: Sitting, Cuff " "Size: Adult Regular)   Pulse 85   Temp 97.7  F (36.5  C) (Temporal)   Resp 16   Ht 1.655 m (5' 5.16\")   Wt 69.3 kg (152 lb 12.8 oz)   SpO2 100%   BMI 25.30 kg/m    Body mass index is 25.3 kg/m .  Physical Exam   GENERAL: alert and no distress  EYES: Eyes grossly normal to inspection, PERRL and conjunctivae and sclerae normal.  No nystagmus.  All 4 visual fields are intact.  HENT: ear canals and TM's normal. Nares are non-congested. Oropharynx is pink and moist. No tender with palpation to the sinuses.   NECK: Supple, no lymphadenopathy.  No tender with palpation to the cervical spine or its paraspinous muscle.  RESP: lungs clear to auscultation - no rales, rhonchi or wheezes  CV: regular rate and rhythm, no murmur, no peripheral edema  ABDOMEN: soft, nontender, no hepatosplenomegaly, no masses and bowel sounds normal  NEURO: Normal strength and tone, mentation intact and speech normal.  Cranial nerves II to XII intact.  DTR +2 throughout.  No focal neurological deficit  PSYCH: mentation appears normal, affect normal/bright    No results found for any visits on 11/05/24.        Signed Electronically by: Maral Augustine Mai, MD    "

## 2024-11-06 ENCOUNTER — HOSPITAL ENCOUNTER (OUTPATIENT)
Dept: MAMMOGRAPHY | Facility: CLINIC | Age: 44
Discharge: HOME | End: 2024-11-06
Attending: FAMILY MEDICINE | Admitting: FAMILY MEDICINE
Payer: COMMERCIAL

## 2024-11-06 DIAGNOSIS — Z12.31 VISIT FOR SCREENING MAMMOGRAM: ICD-10-CM

## 2024-11-06 PROCEDURE — 77063 BREAST TOMOSYNTHESIS BI: CPT

## 2024-12-24 ENCOUNTER — PATIENT OUTREACH (OUTPATIENT)
Dept: FAMILY MEDICINE | Facility: CLINIC | Age: 44
End: 2024-12-24
Payer: COMMERCIAL

## 2024-12-26 DIAGNOSIS — G43.E09 CHRONIC MIGRAINE WITH AURA WITHOUT STATUS MIGRAINOSUS, NOT INTRACTABLE: ICD-10-CM

## 2024-12-26 RX ORDER — TOPIRAMATE 25 MG/1
25 TABLET, FILM COATED ORAL 2 TIMES DAILY
Qty: 60 TABLET | Refills: 1 | OUTPATIENT
Start: 2024-12-26

## 2025-01-22 SDOH — HEALTH STABILITY: PHYSICAL HEALTH: ON AVERAGE, HOW MANY MINUTES DO YOU ENGAGE IN EXERCISE AT THIS LEVEL?: 40 MIN

## 2025-01-22 SDOH — HEALTH STABILITY: PHYSICAL HEALTH: ON AVERAGE, HOW MANY DAYS PER WEEK DO YOU ENGAGE IN MODERATE TO STRENUOUS EXERCISE (LIKE A BRISK WALK)?: 7 DAYS

## 2025-01-22 ASSESSMENT — SOCIAL DETERMINANTS OF HEALTH (SDOH): HOW OFTEN DO YOU GET TOGETHER WITH FRIENDS OR RELATIVES?: ONCE A WEEK

## 2025-01-27 ENCOUNTER — OFFICE VISIT (OUTPATIENT)
Dept: FAMILY MEDICINE | Facility: CLINIC | Age: 45
End: 2025-01-27
Payer: COMMERCIAL

## 2025-01-27 VITALS
SYSTOLIC BLOOD PRESSURE: 128 MMHG | RESPIRATION RATE: 16 BRPM | BODY MASS INDEX: 22.63 KG/M2 | WEIGHT: 135.8 LBS | HEART RATE: 64 BPM | DIASTOLIC BLOOD PRESSURE: 76 MMHG | TEMPERATURE: 98.2 F | HEIGHT: 65 IN | OXYGEN SATURATION: 99 %

## 2025-01-27 DIAGNOSIS — Z87.410 HISTORY OF CERVICAL DYSPLASIA: ICD-10-CM

## 2025-01-27 DIAGNOSIS — B97.7 HIGH RISK HPV INFECTION: ICD-10-CM

## 2025-01-27 DIAGNOSIS — F17.200 TOBACCO USE DISORDER: ICD-10-CM

## 2025-01-27 DIAGNOSIS — Z30.41 ENCOUNTER FOR SURVEILLANCE OF CONTRACEPTIVE PILLS: ICD-10-CM

## 2025-01-27 DIAGNOSIS — G43.E09 CHRONIC MIGRAINE WITH AURA WITHOUT STATUS MIGRAINOSUS, NOT INTRACTABLE: ICD-10-CM

## 2025-01-27 DIAGNOSIS — Z00.00 ROUTINE GENERAL MEDICAL EXAMINATION AT A HEALTH CARE FACILITY: Primary | ICD-10-CM

## 2025-01-27 LAB
ANION GAP SERPL CALCULATED.3IONS-SCNC: 11 MMOL/L (ref 7–15)
BUN SERPL-MCNC: 7.3 MG/DL (ref 6–20)
CALCIUM SERPL-MCNC: 9.3 MG/DL (ref 8.8–10.4)
CHLORIDE SERPL-SCNC: 104 MMOL/L (ref 98–107)
CHOLEST SERPL-MCNC: 191 MG/DL
CREAT SERPL-MCNC: 0.81 MG/DL (ref 0.51–0.95)
EGFRCR SERPLBLD CKD-EPI 2021: >90 ML/MIN/1.73M2
FASTING STATUS PATIENT QL REPORTED: YES
FASTING STATUS PATIENT QL REPORTED: YES
GLUCOSE SERPL-MCNC: 88 MG/DL (ref 70–99)
HCO3 SERPL-SCNC: 21 MMOL/L (ref 22–29)
HDLC SERPL-MCNC: 49 MG/DL
LDLC SERPL CALC-MCNC: 102 MG/DL
NONHDLC SERPL-MCNC: 142 MG/DL
POTASSIUM SERPL-SCNC: 4 MMOL/L (ref 3.4–5.3)
SODIUM SERPL-SCNC: 136 MMOL/L (ref 135–145)
TRIGL SERPL-MCNC: 202 MG/DL

## 2025-01-27 PROCEDURE — G0145 SCR C/V CYTO,THINLAYER,RESCR: HCPCS | Performed by: FAMILY MEDICINE

## 2025-01-27 PROCEDURE — 99213 OFFICE O/P EST LOW 20 MIN: CPT | Mod: 25 | Performed by: FAMILY MEDICINE

## 2025-01-27 PROCEDURE — G2211 COMPLEX E/M VISIT ADD ON: HCPCS | Performed by: FAMILY MEDICINE

## 2025-01-27 PROCEDURE — 36415 COLL VENOUS BLD VENIPUNCTURE: CPT | Performed by: FAMILY MEDICINE

## 2025-01-27 PROCEDURE — 80048 BASIC METABOLIC PNL TOTAL CA: CPT | Performed by: FAMILY MEDICINE

## 2025-01-27 PROCEDURE — 90471 IMMUNIZATION ADMIN: CPT | Performed by: FAMILY MEDICINE

## 2025-01-27 PROCEDURE — 90746 HEPB VACCINE 3 DOSE ADULT IM: CPT | Performed by: FAMILY MEDICINE

## 2025-01-27 PROCEDURE — 80061 LIPID PANEL: CPT | Performed by: FAMILY MEDICINE

## 2025-01-27 PROCEDURE — 87624 HPV HI-RISK TYP POOLED RSLT: CPT | Performed by: FAMILY MEDICINE

## 2025-01-27 PROCEDURE — 99396 PREV VISIT EST AGE 40-64: CPT | Mod: 25 | Performed by: FAMILY MEDICINE

## 2025-01-27 RX ORDER — SUMATRIPTAN SUCCINATE 100 MG/1
100 TABLET ORAL
Qty: 12 TABLET | Refills: 11 | Status: SHIPPED | OUTPATIENT
Start: 2025-01-27

## 2025-01-27 RX ORDER — TOPIRAMATE 25 MG/1
25 TABLET, FILM COATED ORAL 2 TIMES DAILY
Qty: 180 TABLET | Refills: 3 | Status: SHIPPED | OUTPATIENT
Start: 2025-01-27

## 2025-01-27 RX ORDER — DESOGESTREL AND ETHINYL ESTRADIOL 0.15-0.03
1 KIT ORAL DAILY
Qty: 84 TABLET | Refills: 3 | Status: SHIPPED | OUTPATIENT
Start: 2025-01-27

## 2025-01-27 ASSESSMENT — PAIN SCALES - GENERAL: PAINLEVEL_OUTOF10: NO PAIN (0)

## 2025-01-27 NOTE — PATIENT INSTRUCTIONS
Patient Education   Preventive Care Advice   This is general advice given by our system to help you stay healthy. However, your care team may have specific advice just for you. Please talk to your care team about your preventive care needs.  Nutrition  Eat 5 or more servings of fruits and vegetables each day.  Try wheat bread, brown rice and whole grain pasta (instead of white bread, rice, and pasta).  Get enough calcium and vitamin D. Check the label on foods and aim for 100% of the RDA (recommended daily allowance).  Lifestyle  Exercise at least 150 minutes each week  (30 minutes a day, 5 days a week).  Do muscle strengthening activities 2 days a week. These help control your weight and prevent disease.  No smoking.  Wear sunscreen to prevent skin cancer.  Have a dental exam and cleaning every 6 months.  Yearly exams  See your health care team every year to talk about:  Any changes in your health.  Any medicines your care team has prescribed.  Preventive care, family planning, and ways to prevent chronic diseases.  Shots (vaccines)   HPV shots (up to age 26), if you've never had them before.  Hepatitis B shots (up to age 59), if you've never had them before.  COVID-19 shot: Get this shot when it's due.  Flu shot: Get a flu shot every year.  Tetanus shot: Get a tetanus shot every 10 years.  Pneumococcal, hepatitis A, and RSV shots: Ask your care team if you need these based on your risk.  Shingles shot (for age 50 and up)  General health tests  Diabetes screening:  Starting at age 35, Get screened for diabetes at least every 3 years.  If you are younger than age 35, ask your care team if you should be screened for diabetes.  Cholesterol test: At age 39, start having a cholesterol test every 5 years, or more often if advised.  Bone density scan (DEXA): At age 50, ask your care team if you should have this scan for osteoporosis (brittle bones).  Hepatitis C: Get tested at least once in your life.  STIs (sexually  transmitted infections)  Before age 24: Ask your care team if you should be screened for STIs.  After age 24: Get screened for STIs if you're at risk. You are at risk for STIs (including HIV) if:  You are sexually active with more than one person.  You don't use condoms every time.  You or a partner was diagnosed with a sexually transmitted infection.  If you are at risk for HIV, ask about PrEP medicine to prevent HIV.  Get tested for HIV at least once in your life, whether you are at risk for HIV or not.  Cancer screening tests  Cervical cancer screening: If you have a cervix, begin getting regular cervical cancer screening tests starting at age 21.  Breast cancer scan (mammogram): If you've ever had breasts, begin having regular mammograms starting at age 40. This is a scan to check for breast cancer.  Colon cancer screening: It is important to start screening for colon cancer at age 45.  Have a colonoscopy test every 10 years (or more often if you're at risk) Or, ask your provider about stool tests like a FIT test every year or Cologuard test every 3 years.  To learn more about your testing options, visit:   .  For help making a decision, visit:   https://bit.ly/hg54188.  Prostate cancer screening test: If you have a prostate, ask your care team if a prostate cancer screening test (PSA) at age 55 is right for you.  Lung cancer screening: If you are a current or former smoker ages 50 to 80, ask your care team if ongoing lung cancer screenings are right for you.  For informational purposes only. Not to replace the advice of your health care provider. Copyright   2023 [employer]. All rights reserved. Clinically reviewed by the [facility]. [employer] [id number] - REV 01/24.

## 2025-01-27 NOTE — PROGRESS NOTES
Preventive Care Visit  [facility]  Maral Augustine Mai, MD, Family Medicine  Jan 27, 2025  {Provider  Link to [facility] :[id number]}    Assessment & Plan     (Z00.00) Routine general medical examination at a health care facility  (primary encounter diagnosis)  Comment: ***  Plan: Lipid panel reflex to direct LDL Fasting, Basic        metabolic panel  (Ca, Cl, CO2, Creat, Gluc, K,         Na, BUN)        ***    (B97.7) High risk HPV infection  (Z87.410) History of cervical dysplasia - SYDNIE II  Comment: ***  Plan: HPV and Gynecologic Cytology Panel -         Recommended Age 30 - 65 Years        ***    (F17.200) Tobacco use disorder  Comment: ***  Plan: ***    (G43.E09) Chronic migraine with aura without status migrainosus, not intractable  Comment: ***  Plan: topiramate (TOPAMAX) 25 MG tablet, SUMAtriptan         (IMITREX) 100 MG tablet        ***    (Z30.41) Encounter for surveillance of contraceptive pills  Comment: ***  Plan: desogestrel-ethinyl estradiol (JULEBER) 0.15-30        MG-MCG tablet        ***    {Patient advised of split billing (Optional):542667}        Counseling  Appropriate preventive services were addressed with this patient via screening, questionnaire, or discussion as appropriate for fall prevention, nutrition, physical activity, Tobacco-use cessation, social engagement, weight loss and cognition.  Checklist reviewing preventive services available has been given to the patient.  Reviewed patient's diet, addressing concerns and/or questions.       {FOLLOW UP PLANS (Optional) Includes COVID19 Treatment Plan:176200}    Itzel Plaza is a 44 year old, presenting for the following:  Physical        1/27/2025     1:43 PM   Additional Questions   Roomed by Aramis DUENAS    Lisa is a 42-year-old woman here today for physical and general follow up.  No specific concerns, generally has been doing well.  Migraine headache is controlled with Topamax and Imitrex.  HA happens  rarely, perhaps once or twice a week.  No side effect from the medication and overall she is happy with it.     Otherwise doing well.  No major changes to her medical record since last physical about a year ago.  Take only oral contraceptive pills for menstrual bleeding regulating which has been regular. Mild and tolerable hot flashes symptoms.  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  Pap smears have been normal since.  Last year Pap smear was normal but was positive for HR HPV 16 in 2022 and 2023.  She has a colposcopy which was normal and negative biopsy.     Overall doing well, no other concerns today.  No major medical care or procedure done since the last physical about a year ago.  No fever, chills, shortness of breath, chest pain, nausea, vomiting, diarrhea, or constipation.  No runny nose, nasal congestion, coughing, sore throat or sinus pain/pressure.  No problems with sleeping.  No safety or mental health concerns.  Drinks alcohol socially on weekends and smokes approximately 1 pack/day for over 20 years - not ready to quit.  No drugs.  No weight change, not exercising.  No other concerns today.     Patient presents for 5    Health Care Directive  Patient does not have a Health Care Directive: Discussed advance care planning with patient; however, patient declined at this time.      1/22/2025   General Health   How would you rate your overall physical health? Excellent   Feel stress (tense, anxious, or unable to sleep) Only a little   (!) STRESS CONCERN      1/22/2025   Nutrition   Three or more servings of calcium each day? Yes   Diet: I don't know   How many servings of fruit and vegetables per day? (!) 0-1   How many sweetened beverages each day? 0-1         1/22/2025   Exercise   Days per week of moderate/strenous exercise 7 days   Average minutes spent exercising at this level 40 min         1/22/2025   Social Factors   Frequency of  gathering with friends or relatives Once a week   Worry food won't last until get money to buy more No   Food not last or not have enough money for food? No   Do you have housing? (Housing is defined as stable permanent housing and does not include staying ouside in a car, in a tent, in an abandoned building, in an overnight shelter, or couch-surfing.) Yes   Are you worried about losing your housing? No   Lack of transportation? No   Unable to get utilities (heat,electricity)? No         1/22/2025   Dental   Dentist two times every year? Yes         1/22/2025   TB Screening   Were you born outside of the [place]? No         Today's PHQ-2 Score:       1/27/2025     1:29 PM   PHQ-2 ( 1999 [employer])   Q1: Little interest or pleasure in doing things 0   Q2: Feeling down, depressed or hopeless 0   PHQ-2 Score 0    Q1: Little interest or pleasure in doing things Not at all   Q2: Feeling down, depressed or hopeless Not at all   PHQ-2 Score 0       Patient-reported           1/22/2025   Substance Use   If I could quit smoking, I would Completely disagree   I want to quit somking, worry about health affects Completely disagree   Willing to make a plan to quit smoking Completely disagree   Willing to cut down before quitting Completely disagree   Alcohol more than 3/day or more than 7/wk No   Do you use any other substances recreationally? No     Social History     Tobacco Use    Smoking status: Every Day     Current packs/day: 1.00     Average packs/day: 1 pack/day for 21.0 years (21.0 ttl pk-yrs)     Types: Cigarettes    Smokeless tobacco: Never    Tobacco comments:     started age 16   Vaping Use    Vaping status: Never Used   Substance Use Topics    Alcohol use: Yes     Comment: socially    Drug use: No     {Provider  If there are gaps in the social history shown above, please follow the link to update and then refresh the note Link to Social and Substance History :934046}      11/6/2024   LAST FHS-7 RESULTS   1st degree  relative breast or ovarian cancer No   Any relative bilateral breast cancer No   Any male have breast cancer No   Any ONE woman have BOTH breast AND ovarian cancer No   Any woman with breast cancer before 50yrs Yes   2 or more relatives with breast AND/OR ovarian cancer Yes   2 or more relatives with breast AND/OR bowel cancer Yes     {If any of the questions to the FHS7 are answered yes, consider referral for genetic counseling.    Additional indications for genetic referral include personal history of breast or ovarian cancer, genetic mutation in 1st degree relative which increases risk of breast cancer including BRCA1, BRCA2, CARLOS, PALB 2, TP53, CHEK2, PTEN, CDH1, STK11 (per ACS) and/or 1st degree relative with history of pancreatic or high-risk prostate cancer (per NCCN):298406}   {Mammogram Decision Support (Optional):334240}        1/22/2025   STI Screening   New sexual partner(s) since last STI/HIV test? No     History of abnormal Pap smear: { :130722}        Latest Ref Rng & Units 12/5/2023     8:16 AM 10/28/2022    12:40 PM 11/6/2020    11:35 AM   PAP / HPV   PAP  Negative for Intraepithelial Lesion or Malignancy (NILM)  Negative for Intraepithelial Lesion or Malignancy (NILM)     PAP (Historical)    NIL    HPV 16 DNA Negative Positive  Positive     HPV 18 DNA Negative Negative  Negative     Other HR HPV Negative Negative  Negative       ASCVD Risk   The 10-year ASCVD risk score (Rafael WILLIAM, et al., 2019) is: 3.6%    Values used to calculate the score:      Age: 44 years      Sex: Female      Is Non- : No      Diabetic: No      Tobacco smoker: Yes      Systolic Blood Pressure: 128 mmHg      Is BP treated: No      HDL Cholesterol: 39 mg/dL      Total Cholesterol: 174 mg/dL    {Provider  REQUIRED FOR AWV Use the storyboard to review patient history, after sections have been marked as reviewed, refresh note to capture documentation:528046}   Reviewed and updated as needed this  "visit by Provider                    {HISTORY OPTIONS (Optional):503180}    {ROS Picklists (Optional):348417}     Objective    Exam  /76   Pulse 64   Temp 98.2  F (36.8  C) (Temporal)   Resp 16   Ht 1.655 m (5' 5.16\")   Wt 61.6 kg (135 lb 12.8 oz)   SpO2 99%   BMI 22.49 kg/m     Estimated body mass index is 22.49 kg/m  as calculated from the following:    Height as of this encounter: 1.655 m (5' 5.16\").    Weight as of this encounter: 61.6 kg (135 lb 12.8 oz).    Physical Exam  {Exam Choices (Optional):490432}        Signed Electronically by: Maral Augustine Mai, MD  {Email feedback regarding this note to [email or web address]   :533853}  "   HPV 16 DNA Negative Positive  Positive     HPV 18 DNA Negative Negative  Negative     Other HR HPV Negative Negative  Negative       ASCVD Risk   The 10-year ASCVD risk score (Rafael WILLIAM, et al., 2019) is: 3.6%    Values used to calculate the score:      Age: 44 years      Sex: Female      Is Non- : No      Diabetic: No      Tobacco smoker: Yes      Systolic Blood Pressure: 128 mmHg      Is BP treated: No      HDL Cholesterol: 39 mg/dL      Total Cholesterol: 174 mg/dL       Reviewed and updated as needed this visit by Provider   Tobacco  Allergies  Meds  Problems   Surg Hx  Fam Hx  Soc Hx Sexual   Activity          Past Medical History:   Diagnosis Date    Abnormal liver function tests 08/02/2015    ASCUS on Pap smear 9/8/08, 1/20/09    + high risk HPV (done at outside facility)    Gastro-esophageal reflux disease without esophagitis 06/25/2013    History of colposcopy with cervical biopsy 10/11/01, 9/3/09    2001-biopsy = SYDNIE 2.  2009-negative    LSIL (low grade squamous intraepithelial lesion) on Pap smear 05/10/2001    Moderate cervical dysplasia 09/08/2009    5/10/01 pap LSIL 9/12/01 pap ASCUS favor dysplasia 10/11/01 colposcopy/ bx (CIN2)/ECC (negative)/ pap (ASCUS) recom: repeat pap q 3months x 1 year 2002 LEEP 7/26/02 pap NIL, 12/27/02 pap NIL, 4/3/03 pap NIL, 7/24/07 pap NIL 9/8/08 pap ASCUS + high risk HPV  1/20/09 pap ASCUS + high risk HPV 9/3/09 pap NIL/ colposcopy- bx (negative)/ ECC (negative) repeat pap in one year  5/5/11 reminder call 6/17/     Past Surgical History:   Procedure Laterality Date    CHOLECYSTECTOMY      HC COLP CERVIX/UPPER VAGINA W LOOP ELEC BX CERVIX  2001    LEEP TX, CERVICAL  2002    TUBAL LIGATION       Patient Active Problem List   Diagnosis    History of cervical dysplasia - SYDNIE II    Tobacco use disorder    Chronic migraine with aura without status migrainosus, not intractable    High risk HPV infection     Past Surgical History:  "  Procedure Laterality Date    CHOLECYSTECTOMY      HC COLP CERVIX/UPPER VAGINA W LOOP ELEC BX CERVIX  2001    LEEP TX, CERVICAL  2002    TUBAL LIGATION         Social History     Tobacco Use    Smoking status: Every Day     Current packs/day: 1.00     Average packs/day: 1 pack/day for 21.0 years (21.0 ttl pk-yrs)     Types: Cigarettes    Smokeless tobacco: Never    Tobacco comments:     started age 16   Substance Use Topics    Alcohol use: Yes     Comment: socially     Family History   Problem Relation Age of Onset    Hypertension Mother     Hypertension Father     Gout Father     Cancer Father         myofribosis    No Known Problems Brother     No Known Problems Sister     Hypertension Maternal Grandmother     Neurologic Disorder Maternal Grandfather         ALS    Unknown/Adopted Paternal Grandmother     Cancer Paternal Grandfather         unsure    No Known Problems Son     No Known Problems Daughter     No Known Problems Son     Family History Negative No family hx of          Current Outpatient Medications   Medication Sig Dispense Refill    desogestrel-ethinyl estradiol (JULEBER) 0.15-30 MG-MCG tablet Take 1 tablet by mouth daily. 84 tablet 3    SUMAtriptan (IMITREX) 100 MG tablet Take 1 tablet (100 mg) by mouth at onset of headache for migraine. May repeat in 2 hours. Max 4 tablets/24 hours. 12 tablet 11    topiramate (TOPAMAX) 25 MG tablet Take 1 tablet (25 mg) by mouth 2 times daily. 180 tablet 3     Allergies   Allergen Reactions    No Known Drug Allergy          Review of Systems  Constitutional, HEENT, cardiovascular, pulmonary, GI, , musculoskeletal, neuro, skin, endocrine and psych systems are negative, except as otherwise noted.     Objective    Exam  /76   Pulse 64   Temp 98.2  F (36.8  C) (Temporal)   Resp 16   Ht 1.655 m (5' 5.16\")   Wt 61.6 kg (135 lb 12.8 oz)   SpO2 99%   BMI 22.49 kg/m     Estimated body mass index is 22.49 kg/m  as calculated from the following:    Height as of " "this encounter: 1.655 m (5' 5.16\").    Weight as of this encounter: 61.6 kg (135 lb 12.8 oz).    Physical Exam  GENERAL: healthy, alert and no distress.  Speaking in full sentences.  EYES: Eyes grossly normal to inspection, PERRL and conjunctivae and sclerae normal.  No nystagmus.  All 4 visual fields intact.  HENT: ear canals and TM's normal.  Nares are non-congested. Oropharynx is pink and moist. No tender with palpation to the sinuses.   NECK: no adenopathy, supple, no lymphadenopathy or thyromegaly.  No tender with palpation to the cervical spine or its paraspinous muscle.  RESP: lungs clear to auscultation - no rales, rhonchi or wheezes.  Good respiratory effort throughout.  BREAST: Offered but declined, she has no concern about it.   CV: regular rate and rhythm, no murmur  ABDOMEN: soft, nontender, nondistended, no palpable masses organomegaly with normal culture.  No CVA tenderness.   (female): Normal genitalia.  No external lesions noted.  Birth canal looked normal, no abnormal vaginal discharge.  Cervix looked normal.  No tender with pressuring onto the cervix.  MS: no gross musculoskeletal defects noted, no edema. All joints are in the normal range of motion and 4 extremities were equally in strength. Hips, knees, ankles, shoulders, elbows, wrists exams were normal. Fine motor skills of the fingers are intact. Back is straight, no tender with palpation to the spine.  SKIN: Normal no suspicious lesions or rashes  NEURO: Normal strength and tone, mentation intact and speech normal.  Cranial nerve II through XII intact.  DTRs +2 throughout.  No focal neurological deficit.  PSYCH: mentation appears normal, affect normal/bright.  Thoughts intact, no hallucination.      Results for orders placed or performed in visit on 01/27/25   HPV and Gynecologic Cytology Panel - Recommended Age 30 - 65 Years     Status: None   Result Value Ref Range    Human Papilloma Virus 16 DNA Negative Negative    Human Papilloma " Virus 18 DNA Negative Negative    Human Papilloma Virus Other Negative Negative    FINAL DIAGNOSIS       This patient's sample is negative for high risk HPV DNA.          METHODOLOGY: The [employer] system uses automated extraction, simultaneous amplification of HPV (E6/E7 oncogenes) and beta-globin, followed by real time detection of fluorescent labeled HPV and beta globin using specific oligonucleotide probes. The test specifically identifies types HPV 16 DNA and HPV 18 DNA while concurrently detecting the rest of the high risk types (31, 33, 35, 39, 45, 51, 52, 56, 58, 59, 66 or 68).     COMMENTS: This test is not intended for use as a screening device for woman under age 30 with normal cervical cytology. Results should be correlated with cytologic and histologic findings. Close clinical follow up is recommended.      Please see the separate Gynecologic Cytology (Pap) report from the same collection date.     Lipid panel reflex to direct LDL Fasting     Status: Abnormal   Result Value Ref Range    Cholesterol 191 <200 mg/dL    Triglycerides 202 (H) <150 mg/dL    Direct Measure HDL 49 (L) >=50 mg/dL    LDL Cholesterol Calculated 102 (H) <100 mg/dL    Non HDL Cholesterol 142 (H) <130 mg/dL    Patient Fasting > 8hrs? Yes     Narrative    Cholesterol  Desirable: < 200 mg/dL  Borderline High: 200 - 239 mg/dL  High: >= 240 mg/dL    Triglycerides  Normal: < 150 mg/dL  Borderline High: 150 - 199 mg/dL  High: 200-499 mg/dL  Very High: >= 500 mg/dL    Direct Measure HDL  Female: >= 50 mg/dL   Male: >= 40 mg/dL    LDL Cholesterol  Desirable: < 100 mg/dL  Above Desirable: 100 - 129 mg/dL   Borderline High: 130 - 159 mg/dL   High:  160 - 189 mg/dL   Very High: >= 190 mg/dL    Non HDL Cholesterol  Desirable: < 130 mg/dL  Above Desirable: 130 - 159 mg/dL  Borderline High: 160 - 189 mg/dL  High: 190 - 219 mg/dL  Very High: >= 220 mg/dL   Basic metabolic panel  (Ca, Cl, CO2, Creat, Gluc, K, Na, BUN)     Status: Abnormal   Result  Value Ref Range    Sodium 136 135 - 145 mmol/L    Potassium 4.0 3.4 - 5.3 mmol/L    Chloride 104 98 - 107 mmol/L    Carbon Dioxide (CO2) 21 (L) 22 - 29 mmol/L    Anion Gap 11 7 - 15 mmol/L    Urea Nitrogen 7.3 6.0 - 20.0 mg/dL    Creatinine 0.81 0.51 - 0.95 mg/dL    GFR Estimate >90 >60 mL/min/1.73m2    Calcium 9.3 8.8 - 10.4 mg/dL    Glucose 88 70 - 99 mg/dL    Patient Fasting > 8hrs? Yes    Gynecologic Cytology (PAP)     Status: None   Result Value Ref Range    Interpretation        Negative for Intraepithelial Lesion or Malignancy (NILM)    Comment         Papanicolaou Test Limitations:  Cervical cytology is a screening test with limited sensitivity, and regular screening is critical for cancer prevention.  Pap tests are primarily effective for the diagnosis/prevention of squamous cell carcinoma, not adenocarcinoma or other cancers.        Specimen Adequacy       Satisfactory for evaluation, endocervical/transformation zone component present    Clinical Information       none      Previous Abnormal?       No      Performing Labs       The technical component of this testing was completed at [facility].    Stain controls for all stains resulted within this report have been reviewed and show appropriate reactivity.      Associated HPV Report       Please see the associated HPV High Risk Types DNA Cervical report for Specimen [id number] from the same collection date.              Signed Electronically by: Maral Augustine Mai, MD

## 2025-01-29 LAB
HPV HR 12 DNA CVX QL NAA+PROBE: NEGATIVE
HPV16 DNA CVX QL NAA+PROBE: NEGATIVE
HPV18 DNA CVX QL NAA+PROBE: NEGATIVE
HUMAN PAPILLOMA VIRUS FINAL DIAGNOSIS: NORMAL

## 2025-01-31 LAB
BKR AP ASSOCIATED HPV REPORT: NORMAL
BKR LAB AP GYN ADEQUACY: NORMAL
BKR LAB AP GYN INTERPRETATION: NORMAL
BKR LAB AP PREVIOUS ABNORMAL: NORMAL
PATH REPORT.COMMENTS IMP SPEC: NORMAL
PATH REPORT.COMMENTS IMP SPEC: NORMAL
PATH REPORT.RELEVANT HX SPEC: NORMAL

## 2025-02-04 ENCOUNTER — PATIENT OUTREACH (OUTPATIENT)
Dept: FAMILY MEDICINE | Facility: CLINIC | Age: 45
End: 2025-02-04
Payer: COMMERCIAL

## 2025-02-04 DIAGNOSIS — Z87.410 HISTORY OF CERVICAL DYSPLASIA: Primary | ICD-10-CM
